# Patient Record
Sex: FEMALE | Race: WHITE | NOT HISPANIC OR LATINO | Employment: FULL TIME | URBAN - METROPOLITAN AREA
[De-identification: names, ages, dates, MRNs, and addresses within clinical notes are randomized per-mention and may not be internally consistent; named-entity substitution may affect disease eponyms.]

---

## 2024-11-04 ENCOUNTER — OFFICE VISIT (OUTPATIENT)
Dept: OBSTETRICS AND GYNECOLOGY | Age: 24
End: 2024-11-04
Payer: COMMERCIAL

## 2024-11-04 VITALS
SYSTOLIC BLOOD PRESSURE: 102 MMHG | HEIGHT: 61 IN | BODY MASS INDEX: 25.19 KG/M2 | WEIGHT: 133.4 LBS | DIASTOLIC BLOOD PRESSURE: 62 MMHG

## 2024-11-04 DIAGNOSIS — O09.32 LATE PRENATAL CARE AFFECTING PREGNANCY IN SECOND TRIMESTER: ICD-10-CM

## 2024-11-04 DIAGNOSIS — O36.80X0 ENCOUNTER TO DETERMINE FETAL VIABILITY OF PREGNANCY, SINGLE OR UNSPECIFIED FETUS: ICD-10-CM

## 2024-11-04 DIAGNOSIS — O09.32 LATE PRENATAL CARE AFFECTING PREGNANCY IN SECOND TRIMESTER: Primary | ICD-10-CM

## 2024-11-04 DIAGNOSIS — Z3A.20 20 WEEKS GESTATION OF PREGNANCY: Primary | ICD-10-CM

## 2024-11-04 LAB
B-HCG UR QL: POSITIVE
EXPIRATION DATE: ABNORMAL
INTERNAL NEGATIVE CONTROL: NEGATIVE
INTERNAL POSITIVE CONTROL: POSITIVE
Lab: ABNORMAL

## 2024-11-04 NOTE — PROGRESS NOTES
"Darrion WOODS Story is a 24 y.o. female is being seen today for   Chief Complaint   Patient presents with    Pregnancy conformation     New Gyn pt is 20w6d lmp 6/11    CC no complaints today    .    History of Present Illness    New patient to our office  Here for initial OB visit with her  Satnam  LMP 6/11, making her 20w6d today  States she was without insurance until now- had applied for medicaid and then ended up getting a job that provided insurance after 60 days  Has not received any PNC up to this point other than one viability ultrasound at a clinic  This is her first pregnancy- it was a planned pregnancy  She denies any significant health history, has never been on any medication or had any surgeries  Has had one pap prior that was normal  No family history of either side of genetic defects    The following portions of the patient's history were reviewed and updated as appropriate: allergies, current medications, past family history, past medical history, past social history, past surgical history and problem list.    /62   Ht 154.4 cm (60.79\")   Wt 60.5 kg (133 lb 6.4 oz)   LMP 06/11/2024 (Exact Date)   BMI 25.38 kg/m²         Review of Systems   Constitutional: Negative.    HENT: Negative.     Eyes: Negative.    Respiratory: Negative.     Cardiovascular: Negative.    Gastrointestinal: Negative.    Endocrine: Negative.    Genitourinary: Negative.    Musculoskeletal: Negative.    Skin: Negative.    Allergic/Immunologic: Negative.    Neurological: Negative.    Hematological: Negative.    Psychiatric/Behavioral: Negative.         Objective   Physical Exam  Constitutional:       Appearance: She is well-developed.   Cardiovascular:      Rate and Rhythm: Normal rate and regular rhythm.   Pulmonary:      Effort: Pulmonary effort is normal.   Abdominal:      General: Bowel sounds are normal. There is distension.      Palpations: Abdomen is soft.      Tenderness: There is no abdominal " tenderness.      Comments: Fundus at umbilicus   Skin:     General: Skin is warm and dry.   Neurological:      Mental Status: She is alert and oriented to person, place, and time.   Psychiatric:         Behavior: Behavior normal.           Assessment & Plan   Diagnoses and all orders for this visit:    1. 20 weeks gestation of pregnancy (Primary)  -     POC Pregnancy, Urine  -     Hemoglobinopathy Fractionation Castell  -     Varicella Zoster Antibody, IgG  -     Chlamydia trachomatis, Neisseria gonorrhoeae, PCR - Urine, Urine, Clean Catch  -     Urine Culture - Urine, Urine, Clean Catch  -     Drug Profile Urine - 9 Drugs - Urine, Clean Catch  -     OB Panel With HIV and RPR    2. Encounter to determine fetal viability of pregnancy, single or unspecified fetus    3. Late prenatal care affecting pregnancy in second trimester    Other orders  -     Fluzone >6mos ([id number])        Ultrasound done- S=D, anatomy normal but incomplete.  EFW 36%m CL 3.14cm  OB labs sent today  Declines NIPTs  Flu shot today  New OB folder given  Patient is taking Prenatal vitamins  Problem list reviewed and updated  Reviewed routine prenatal care with the office to include but not limited to expected weight gain during pregnancy, Tylenol products are fine, avoid aspirin and ibuprofen; Zika (travel restrictions/ok to use insect repellant); not to change cat litter; food restrictions; avoidance of alcohol, tobacco, drugs and saunas/hot tubs. Discussed that the COVID and Flu vaccine is safe and recommended in pregnancy.   SAB warnings reviewed  All questions answered   Pap deferred for postpartum unless primary OB prefers do it at next visit    Plan follow up in 4 weeks with Glucose test     Total time spent today with Helen  was 45 minutes (level 4).  Greater than 50% of the time was spent coordinating care, answering her questions and counseling regarding exercise in pregnancy, nutrition in pregnancy, weight gain in pregnancy, work and  travel restrictions during pregnancy, list of OTC medications acceptable in pregnancy and call coverage groups and pathophysiology of her presenting problem along with plans for any diagnositc work-up and treatment.

## 2024-11-05 LAB
ABO GROUP BLD: ABNORMAL
AMPHETAMINES UR QL SCN: NEGATIVE NG/ML
BARBITURATES UR QL SCN: NEGATIVE NG/ML
BASOPHILS # BLD AUTO: 0.1 X10E3/UL (ref 0–0.2)
BASOPHILS NFR BLD AUTO: 0 %
BENZODIAZ UR QL: NEGATIVE NG/ML
BLD GP AB SCN SERPL QL: NEGATIVE
BZE UR QL: NEGATIVE NG/ML
CANNABINOIDS UR QL SCN: NEGATIVE NG/ML
EOSINOPHIL # BLD AUTO: 0.1 X10E3/UL (ref 0–0.4)
EOSINOPHIL NFR BLD AUTO: 1 %
ERYTHROCYTE [DISTWIDTH] IN BLOOD BY AUTOMATED COUNT: 12.7 % (ref 11.7–15.4)
HBV SURFACE AG SERPL QL IA: NEGATIVE
HCT VFR BLD AUTO: 37.3 % (ref 34–46.6)
HCV AB SERPL QL IA: NORMAL
HCV IGG SERPL QL IA: NON REACTIVE
HGB A MFR BLD ELPH: 97.1 % (ref 96.4–98.8)
HGB A2 MFR BLD ELPH: 2.9 % (ref 1.8–3.2)
HGB BLD-MCNC: 12.2 G/DL (ref 11.1–15.9)
HGB F MFR BLD ELPH: 0 % (ref 0–2)
HGB FRACT BLD-IMP: NORMAL
HGB S MFR BLD ELPH: 0 %
HIV 1+2 AB+HIV1 P24 AG SERPL QL IA: NON REACTIVE
IMM GRANULOCYTES # BLD AUTO: 0.2 X10E3/UL (ref 0–0.1)
IMM GRANULOCYTES NFR BLD AUTO: 1 %
LYMPHOCYTES # BLD AUTO: 2 X10E3/UL (ref 0.7–3.1)
LYMPHOCYTES NFR BLD AUTO: 17 %
MCH RBC QN AUTO: 29.3 PG (ref 26.6–33)
MCHC RBC AUTO-ENTMCNC: 32.7 G/DL (ref 31.5–35.7)
MCV RBC AUTO: 90 FL (ref 79–97)
METHADONE UR QL SCN: NEGATIVE NG/ML
MONOCYTES # BLD AUTO: 0.7 X10E3/UL (ref 0.1–0.9)
MONOCYTES NFR BLD AUTO: 6 %
NEUTROPHILS # BLD AUTO: 8.9 X10E3/UL (ref 1.4–7)
NEUTROPHILS NFR BLD AUTO: 75 %
OPIATES UR QL: NEGATIVE NG/ML
PCP UR QL SCN: NEGATIVE NG/ML
PLATELET # BLD AUTO: 241 X10E3/UL (ref 150–450)
PROPOXYPH UR QL SCN: NEGATIVE NG/ML
RBC # BLD AUTO: 4.16 X10E6/UL (ref 3.77–5.28)
RH BLD: POSITIVE
RPR SER QL: NON REACTIVE
RUBV IGG SERPL IA-ACNC: 1.95 INDEX
VZV IGG SER QL IA: NON REACTIVE
WBC # BLD AUTO: 11.8 X10E3/UL (ref 3.4–10.8)

## 2024-11-06 LAB
BACTERIA UR CULT: NO GROWTH
BACTERIA UR CULT: NORMAL
C TRACH RRNA SPEC QL NAA+PROBE: NEGATIVE
N GONORRHOEA RRNA SPEC QL NAA+PROBE: NEGATIVE

## 2024-11-07 ENCOUNTER — PATIENT ROUNDING (BHMG ONLY) (OUTPATIENT)
Dept: OBSTETRICS AND GYNECOLOGY | Age: 24
End: 2024-11-07
Payer: COMMERCIAL

## 2024-12-03 ENCOUNTER — INITIAL PRENATAL (OUTPATIENT)
Dept: OBSTETRICS AND GYNECOLOGY | Age: 24
End: 2024-12-03
Payer: COMMERCIAL

## 2024-12-03 VITALS — BODY MASS INDEX: 26.45 KG/M2 | SYSTOLIC BLOOD PRESSURE: 108 MMHG | WEIGHT: 139 LBS | DIASTOLIC BLOOD PRESSURE: 60 MMHG

## 2024-12-03 DIAGNOSIS — Z34.90 PREGNANCY, UNSPECIFIED GESTATIONAL AGE: ICD-10-CM

## 2024-12-03 DIAGNOSIS — Z13.89 SCREENING FOR BLOOD OR PROTEIN IN URINE: Primary | ICD-10-CM

## 2024-12-03 DIAGNOSIS — O09.32 LATE PRENATAL CARE AFFECTING PREGNANCY IN SECOND TRIMESTER: ICD-10-CM

## 2024-12-03 PROBLEM — O36.80X0 ENCOUNTER TO DETERMINE FETAL VIABILITY OF PREGNANCY: Status: RESOLVED | Noted: 2024-11-04 | Resolved: 2024-12-03

## 2024-12-03 PROBLEM — Z3A.20 20 WEEKS GESTATION OF PREGNANCY: Status: RESOLVED | Noted: 2024-11-04 | Resolved: 2024-12-03

## 2024-12-03 LAB
GLUCOSE UR STRIP-MCNC: NEGATIVE MG/DL
PROT UR STRIP-MCNC: NEGATIVE MG/DL

## 2024-12-03 PROCEDURE — 0501F PRENATAL FLOW SHEET: CPT | Performed by: OBSTETRICS & GYNECOLOGY

## 2024-12-03 NOTE — PROGRESS NOTES
Chief Complaint   Patient presents with    Initial Prenatal Visit       HPI: 24 y.o.  at 25w0d by sure LMP consistent with then 4 days with 20-week ultrasound.  Patient also reports that she had a 15-week ultrasound done in [facility] that was in agreement with that EDC.  Patient is here today with her boyfriend Satnam.  They are excited about the pregnancy.  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  She is taking prenatal vitamins.  She has some mild back pain.  She thinks that she may have had chickenpox vaccination but she is going to check.  She is also going to check to see if her paternal aunt actually had ovarian cancer or another type of cancer.  Patient has had her flu shot    Relevant data reviewed:    Last OB US Data (since 2024)         Value Time User    EFW%ILE  36%ile 2024  7:22 AM Satnam Cole MD    AC%ILE  26%ile 2024  7:22 AM Satnam Cole MD    Fetal Survey  Incomplete 2024  7:22 AM Satnam Cole MD    Placenta location  Anterior 2024  7:22 AM Satnam Cole MD          Vitals:    24 0916   BP: 108/60   Weight: 63 kg (139 lb)     Total weight gain for pregnancy:  -0.454 kg (-1 lb)  Weight gain for pregnancy is low      Review of systems:   Gen: sleep deprived  CV:     negative  GI: negative  :   good fetal movement noted   MS:    back pain   Neuro: negative  Pul: negative    Physical Exam  Constitutional:       General: She is not in acute distress.     Appearance: Normal appearance. She is not ill-appearing.   Cardiovascular:      Rate and Rhythm: Regular rhythm.      Heart sounds: Normal heart sounds.   Pulmonary:      Breath sounds: Normal breath sounds.   Neurological:      Mental Status: She is alert.   Psychiatric:         Mood and Affect: Mood normal.         Thought Content: Thought content normal.         Judgment: Judgment normal.         A/P  1. Intrauterine pregnancy at 25w0d   2. Pregnancy Risk:  NORMAL    Diagnoses and all orders  for this visit:    1. Screening for blood or protein in urine (Primary)  -     POC Urinalysis Dipstick    2. Pregnancy, unspecified gestational age    3. Late prenatal care affecting pregnancy in second trimester    Ultrasound was reviewed today to complete anatomy.  Anatomy appears normal.  Baby is a girl.  We discussed cell free DNA and carrier testing but the patient declines.  She will check to see if she had varicella vaccination  She will check on her aunts type of cancer  New OB information was reviewed in detail  Return in 3 weeks for third trimester testing    Nutrition and weight gain were addressed.  Practice OB call structure was discussed.   Encouraged exercise at least 3 x weekly   Discussed Covid vaccination in pregnancy including CDC guidelines.  Vaccination strongly encouraged with risk of potential severe illness in unvaccinated.    -----------------------  PLAN:   Return in about 20 days (around 12/23/2024) for Recheck.    Satnam Cole MD  12/3/2024 10:03 EST

## 2025-01-14 ENCOUNTER — ROUTINE PRENATAL (OUTPATIENT)
Dept: OBSTETRICS AND GYNECOLOGY | Age: 25
End: 2025-01-14
Payer: COMMERCIAL

## 2025-01-14 VITALS — WEIGHT: 146 LBS | DIASTOLIC BLOOD PRESSURE: 74 MMHG | BODY MASS INDEX: 27.78 KG/M2 | SYSTOLIC BLOOD PRESSURE: 108 MMHG

## 2025-01-14 DIAGNOSIS — Z13.1 SCREENING FOR DIABETES MELLITUS: ICD-10-CM

## 2025-01-14 DIAGNOSIS — O09.33 INSUFFICIENT PRENATAL CARE IN THIRD TRIMESTER: ICD-10-CM

## 2025-01-14 DIAGNOSIS — O09.32 LATE PRENATAL CARE AFFECTING PREGNANCY IN SECOND TRIMESTER: ICD-10-CM

## 2025-01-14 DIAGNOSIS — Z13.89 SCREENING FOR BLOOD OR PROTEIN IN URINE: Primary | ICD-10-CM

## 2025-01-14 DIAGNOSIS — Z13.0 SCREENING FOR IRON DEFICIENCY ANEMIA: ICD-10-CM

## 2025-01-14 DIAGNOSIS — Z3A.31 31 WEEKS GESTATION OF PREGNANCY: ICD-10-CM

## 2025-01-14 LAB
GLUCOSE UR STRIP-MCNC: NEGATIVE MG/DL
PROT UR STRIP-MCNC: NEGATIVE MG/DL

## 2025-01-14 NOTE — PROGRESS NOTES
CC- pregnancy    HPI: 24 y.o.  at 31w0d patient reports that she missed visits due to having a cold.  She reports that she did check and she did have the chickenpox vaccination.  She was unable to make any contact with her aunt who had ovarian cancer.    EXAM:  Last OB US Data (since 2024)         Value Time User    EFW%ILE  58%ile 2025  2:40 PM Satnam Cole MD    AC%ILE  56%ile 2025  2:40 PM Satnam Cole MD    Fetal Survey  NL/Complete 2024  9:42 AM Satnam Cole MD    Placenta location  Anterior 2024  7:22 AM Satnam Cole MD          Vitals:    25 1151   BP: 108/74   Weight: 66.2 kg (146 lb)     Total weight gain for pregnancy:  2.722 kg (6 lb)  Weight gain for pregnancy is low  Ultrasound shows estimated fetal weight of 3 pounds 15 ounces at the 50th percentile.  Abdominal circumference at the 56 percentile.  Baby is vertex and HAI is normal at 18  Abdomen is nontender.  Fundal height is appropriate    A/P  1. Intrauterine pregnancy at 31w0d   2. Pregnancy Risk:  COMPLICATED    Diagnoses and all orders for this visit:    1. Screening for blood or protein in urine (Primary)  -     POC Urinalysis Dipstick    2. Screening for diabetes mellitus  -     Gestational Screen 1 Hr ([facility])    3. Screening for iron deficiency anemia  -     CBC (No Diff)    4. Late prenatal care affecting pregnancy in second trimester    5. 31 weeks gestation of pregnancy    6. Insufficient prenatal care in third trimester    Other orders  -     Tdap Vaccine => 6yo IM (BOOSTRIX/ADACEL)      -----------------------  Discussed the importance of regular prenatal care  Third trimester labs and Tdap was done today.  Patient initially declined the Tdap but after counseling agreed  Fetal weight is normal today  Patient is considering natural childbirth  Follow-up in 2 weeks  Patient is looking for a pediatrician.  She does live in [place] [facility].      Satnam Cole MD  2025 14:41 EST

## 2025-01-15 LAB
ERYTHROCYTE [DISTWIDTH] IN BLOOD BY AUTOMATED COUNT: 13.1 % (ref 11.7–15.4)
GLUCOSE 1H P 50 G GLC PO SERPL-MCNC: 89 MG/DL (ref 70–139)
HCT VFR BLD AUTO: 34.8 % (ref 34–46.6)
HGB BLD-MCNC: 11.2 G/DL (ref 11.1–15.9)
MCH RBC QN AUTO: 28.4 PG (ref 26.6–33)
MCHC RBC AUTO-ENTMCNC: 32.2 G/DL (ref 31.5–35.7)
MCV RBC AUTO: 88 FL (ref 79–97)
PLATELET # BLD AUTO: 212 X10E3/UL (ref 150–450)
RBC # BLD AUTO: 3.95 X10E6/UL (ref 3.77–5.28)
WBC # BLD AUTO: 11.9 X10E3/UL (ref 3.4–10.8)

## 2025-01-28 ENCOUNTER — ROUTINE PRENATAL (OUTPATIENT)
Dept: OBSTETRICS AND GYNECOLOGY | Age: 25
End: 2025-01-28
Payer: COMMERCIAL

## 2025-01-28 VITALS — SYSTOLIC BLOOD PRESSURE: 108 MMHG | BODY MASS INDEX: 28.73 KG/M2 | DIASTOLIC BLOOD PRESSURE: 62 MMHG | WEIGHT: 151 LBS

## 2025-01-28 DIAGNOSIS — Z13.89 SCREENING FOR BLOOD OR PROTEIN IN URINE: Primary | ICD-10-CM

## 2025-01-28 DIAGNOSIS — Z3A.33 33 WEEKS GESTATION OF PREGNANCY: ICD-10-CM

## 2025-01-28 DIAGNOSIS — O09.32 LATE PRENATAL CARE AFFECTING PREGNANCY IN SECOND TRIMESTER: ICD-10-CM

## 2025-01-28 LAB
GLUCOSE UR STRIP-MCNC: NEGATIVE MG/DL
PROT UR STRIP-MCNC: NEGATIVE MG/DL

## 2025-01-28 NOTE — PROGRESS NOTES
CC- pregnancy    HPI: 24 y.o.  at 33w0d patient has trouble lying down to sleep.  She does sit propped up and she feels okay.  She sometimes feels little bit lightheaded when she lays flat on her back.  Baby is moving well.  She is still looking for a pediatrician.    EXAM:  Last OB US Data (since 2024)         Value Time User    EFW%ILE  58%ile 2025  2:40 PM Satnam Cole MD    AC%ILE  56%ile 2025  2:40 PM Satnam Cole MD    Fetal Survey  NL/Complete 2024  9:42 AM Satnam Cole MD    Placenta location  Anterior 2024  7:22 AM Satnam Cole MD          Vitals:    25 1105   BP: 108/62   Weight: 68.5 kg (151 lb)     Total weight gain for pregnancy:  4.99 kg (11 lb)  Weight gain for pregnancy is low but improving.  Doppler heart tones are positive and fundal height is appropriate  Third trimester labs are normal    A/P  1. Intrauterine pregnancy at 33w0d   2. Pregnancy Risk:  COMPLICATED    Diagnoses and all orders for this visit:    1. Screening for blood or protein in urine (Primary)  -     POC Urinalysis Dipstick    2. Late prenatal care affecting pregnancy in second trimester    3. 33 weeks gestation of pregnancy      -----------------------    Discussed sleeping positions  Patient is going to have a massage  Recommend kick counts  Patient is looking for pediatrician  I do need to follow-up at her next visit on family history of ovarian cancer and see if she wants to do genetic testing.    Satnam Cole MD  2025 12:57 EST

## 2025-02-05 ENCOUNTER — TELEPHONE (OUTPATIENT)
Dept: OBSTETRICS AND GYNECOLOGY | Age: 25
End: 2025-02-05
Payer: COMMERCIAL

## 2025-02-05 NOTE — TELEPHONE ENCOUNTER
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

## 2025-02-10 ENCOUNTER — ROUTINE PRENATAL (OUTPATIENT)
Dept: OBSTETRICS AND GYNECOLOGY | Age: 25
End: 2025-02-10
Payer: COMMERCIAL

## 2025-02-10 VITALS — DIASTOLIC BLOOD PRESSURE: 64 MMHG | SYSTOLIC BLOOD PRESSURE: 108 MMHG | BODY MASS INDEX: 28.92 KG/M2 | WEIGHT: 152 LBS

## 2025-02-10 DIAGNOSIS — Z3A.34 34 WEEKS GESTATION OF PREGNANCY: ICD-10-CM

## 2025-02-10 DIAGNOSIS — O09.32 LATE PRENATAL CARE AFFECTING PREGNANCY IN SECOND TRIMESTER: ICD-10-CM

## 2025-02-10 DIAGNOSIS — Z13.89 SCREENING FOR BLOOD OR PROTEIN IN URINE: Primary | ICD-10-CM

## 2025-02-10 LAB
GLUCOSE UR STRIP-MCNC: NEGATIVE MG/DL
PROT UR STRIP-MCNC: NEGATIVE MG/DL

## 2025-02-10 NOTE — PROGRESS NOTES
CC- pregnancy    HPI: 24 y.o.  at 34w6d patient is feeling good fetal movements.  No complaints.  She does not want to do genetic testing for family history of ovarian cancer.  She is looking for a pediatrician.    EXAM:  Last OB US Data (since 2024)         Value Time User    EFW%ILE  58%ile 2025  2:40 PM Satnam Cole MD    AC%ILE  56%ile 2025  2:40 PM Satnam Cole MD    Fetal Survey  NL/Complete 2024  9:42 AM Satnam Cole MD    Placenta location  Anterior 2024  7:22 AM Satnam Cole MD          Vitals:    02/10/25 1512   BP: 108/64   Weight: 68.9 kg (152 lb)     Total weight gain for pregnancy:  5.443 kg (12 lb)  Weight gain for pregnancy is low but improving  Doppler tones are positive and fundal height is appropriate    A/P  1. Intrauterine pregnancy at 34w6d   2. Pregnancy Risk:  COMPLICATED    Diagnoses and all orders for this visit:    1. Screening for blood or protein in urine (Primary)  -     POC Urinalysis Dipstick    2. Late prenatal care affecting pregnancy in second trimester    3. 34 weeks gestation of pregnancy      -----------------------  Recommend kick counts  Discussed GBS testing and cervical checks  Patient does not want to do ovarian cancer genetic testing.  She is looking for pediatrician.      Satnam Cole MD  2/10/2025 15:21 EST

## 2025-02-18 ENCOUNTER — ROUTINE PRENATAL (OUTPATIENT)
Dept: OBSTETRICS AND GYNECOLOGY | Age: 25
End: 2025-02-18
Payer: COMMERCIAL

## 2025-02-18 VITALS — SYSTOLIC BLOOD PRESSURE: 104 MMHG | DIASTOLIC BLOOD PRESSURE: 58 MMHG | WEIGHT: 154 LBS | BODY MASS INDEX: 29.3 KG/M2

## 2025-02-18 DIAGNOSIS — Z36.85 ANTENATAL SCREENING FOR STREPTOCOCCUS B: ICD-10-CM

## 2025-02-18 DIAGNOSIS — Z13.89 SCREENING FOR BLOOD OR PROTEIN IN URINE: Primary | ICD-10-CM

## 2025-02-18 DIAGNOSIS — O09.32 LATE PRENATAL CARE AFFECTING PREGNANCY IN SECOND TRIMESTER: ICD-10-CM

## 2025-02-18 DIAGNOSIS — Z3A.36 36 WEEKS GESTATION OF PREGNANCY: ICD-10-CM

## 2025-02-18 LAB
GLUCOSE UR STRIP-MCNC: NEGATIVE MG/DL
PROT UR STRIP-MCNC: NEGATIVE MG/DL

## 2025-02-18 NOTE — PROGRESS NOTES
CC- pregnancy    HPI: 24 y.o.  at 36w0d patient is feeling some pelvic pressure.  No regular painful contractions.  Baby is moving well.  She did pick at her pediatrician.    EXAM:  Last OB US Data (since 2024)         Value Time User    EFW%ILE  58%ile 2025  2:40 PM Satnam Cole MD    AC%ILE  56%ile 2025  2:40 PM Satnam Cole MD    Fetal Survey  NL/Complete 2024  9:42 AM Satnam Cole MD    Placenta location  Anterior 2024  7:22 AM Satnam Cole MD          Vitals:    25 1401   BP: 104/58   Weight: 69.9 kg (154 lb)     Total weight gain for pregnancy:  6.35 kg (14 lb)  Weight gain for pregnancy is now normal  GBS swab is collected today  Cervix is closed thick and posterior  Doppler tones are positive and fundal height is appropriate  No proteinuria today      A/P  1. Intrauterine pregnancy at 36w0d   2. Pregnancy Risk:  NORMAL    Diagnoses and all orders for this visit:    1. Screening for blood or protein in urine (Primary)  -     POC Urinalysis Dipstick    2.  screening for streptococcus B  -     Strep B Screen - Swab, Vaginal/Rectum    3. 36 weeks gestation of pregnancy    4. Late prenatal care affecting pregnancy in second trimester      -----------------------  Pediatrician recorded today  Recommend kick counting  Follow-up weekly      Satnam Cole MD  2025 17:51 EST

## 2025-02-20 PROBLEM — O99.820 GBS (GROUP B STREPTOCOCCUS CARRIER), +RV CULTURE, CURRENTLY PREGNANT: Status: ACTIVE | Noted: 2025-02-20

## 2025-02-20 LAB — GP B STREP DNA SPEC QL NAA+PROBE: POSITIVE

## 2025-02-21 ENCOUNTER — TELEPHONE (OUTPATIENT)
Dept: OBSTETRICS AND GYNECOLOGY | Age: 25
End: 2025-02-21
Payer: COMMERCIAL

## 2025-02-24 ENCOUNTER — ROUTINE PRENATAL (OUTPATIENT)
Dept: OBSTETRICS AND GYNECOLOGY | Age: 25
End: 2025-02-24
Payer: COMMERCIAL

## 2025-02-24 VITALS — WEIGHT: 156 LBS | DIASTOLIC BLOOD PRESSURE: 76 MMHG | BODY MASS INDEX: 29.68 KG/M2 | SYSTOLIC BLOOD PRESSURE: 120 MMHG

## 2025-02-24 DIAGNOSIS — Z3A.36 36 WEEKS GESTATION OF PREGNANCY: ICD-10-CM

## 2025-02-24 DIAGNOSIS — Z13.89 SCREENING FOR BLOOD OR PROTEIN IN URINE: Primary | ICD-10-CM

## 2025-02-24 DIAGNOSIS — O09.32 LATE PRENATAL CARE AFFECTING PREGNANCY IN SECOND TRIMESTER: ICD-10-CM

## 2025-02-24 DIAGNOSIS — O99.820 GBS (GROUP B STREPTOCOCCUS CARRIER), +RV CULTURE, CURRENTLY PREGNANT: ICD-10-CM

## 2025-02-24 LAB
GLUCOSE UR STRIP-MCNC: NEGATIVE MG/DL
PROT UR STRIP-MCNC: NEGATIVE MG/DL

## 2025-02-24 NOTE — PROGRESS NOTES
CC- pregnancy    HPI: 24 y.o.  at 36w6d patient is feeling good fetal movements.  No regular contractions.    EXAM:  Last OB US Data (since 2024)         Value Time User    EFW%ILE  58%ile 2025  2:40 PM Satnam Cole MD    AC%ILE  56%ile 2025  2:40 PM Satnam Cole MD    Fetal Survey  NL/Complete 2024  9:42 AM Satnam Cole MD    Placenta location  Anterior 2024  7:22 AM Satnam Cole MD          Vitals:    25 1326   BP: 120/76   Weight: 70.8 kg (156 lb)     Total weight gain for pregnancy:  7.258 kg (16 lb)  Gain for pregnancy is normal  GBS is positive  Cervix is 1 cm 50% -2 station and posterior  Doppler heart tones are positive and fundal height is appropriate    A/P  1. Intrauterine pregnancy at 36w6d   2. Pregnancy Risk:  NORMAL    Diagnoses and all orders for this visit:    1. Screening for blood or protein in urine (Primary)  -     POC Urinalysis Dipstick    2. GBS (group B Streptococcus carrier), +RV culture, currently pregnant    3. 36 weeks gestation of pregnancy    4. Late prenatal care affecting pregnancy in second trimester      ----------------------  Discussed GBS treatment in labor  Labor warnings given  Recommend kick counts.      Satnam Cole MD  2025 13:45 EST

## 2025-03-03 ENCOUNTER — ROUTINE PRENATAL (OUTPATIENT)
Dept: OBSTETRICS AND GYNECOLOGY | Age: 25
End: 2025-03-03
Payer: COMMERCIAL

## 2025-03-03 VITALS — BODY MASS INDEX: 30.44 KG/M2 | WEIGHT: 160 LBS | DIASTOLIC BLOOD PRESSURE: 60 MMHG | SYSTOLIC BLOOD PRESSURE: 120 MMHG

## 2025-03-03 DIAGNOSIS — Z13.89 SCREENING FOR BLOOD OR PROTEIN IN URINE: Primary | ICD-10-CM

## 2025-03-03 DIAGNOSIS — O99.820 GBS (GROUP B STREPTOCOCCUS CARRIER), +RV CULTURE, CURRENTLY PREGNANT: ICD-10-CM

## 2025-03-03 DIAGNOSIS — O09.32 LATE PRENATAL CARE AFFECTING PREGNANCY IN SECOND TRIMESTER: ICD-10-CM

## 2025-03-03 DIAGNOSIS — Z3A.37 37 WEEKS GESTATION OF PREGNANCY: ICD-10-CM

## 2025-03-03 LAB
GLUCOSE UR STRIP-MCNC: NEGATIVE MG/DL
PROT UR STRIP-MCNC: NEGATIVE MG/DL

## 2025-03-03 NOTE — PROGRESS NOTES
CC- pregnancy    HPI: 24 y.o.  at 37w6d patient is feeling well.  She has no complaints.  Baby is active.    EXAM:  Last OB US Data (since 8/15/2024)         Value Time User    EFW%ILE  58%ile 2025  2:40 PM Satnam Cole MD    AC%ILE  56%ile 2025  2:40 PM Satnam Cole MD    Fetal Survey  NL/Complete 2024  9:42 AM Satnam Cole MD    Placenta location  Anterior 2024  7:22 AM Satnam Cole MD          Vitals:    25 1355   BP: 120/60   Weight: 72.6 kg (160 lb)     Total weight gain for pregnancy:  9.072 kg (20 lb)  Weight gain for pregnancy is normal  No proteinuria  Cervix is 1 to 2 cm / 50%/moderate consistency and -2 station  Doppler heart tones are positive and fundal height is appropriate    A/P  1. Intrauterine pregnancy at 37w6d   2. Pregnancy Risk:  NORMAL    Diagnoses and all orders for this visit:    1. Screening for blood or protein in urine (Primary)  -     POC Urinalysis Dipstick    2. GBS (group B Streptococcus carrier), +RV culture, currently pregnant    3. 37 weeks gestation of pregnancy    4. Late prenatal care affecting pregnancy in second trimester      -----------------------  Recommend kick counts  Continue weekly visits  GBS treatment in labor.      Satnam Cole MD  3/3/2025 14:27 EST

## 2025-03-10 ENCOUNTER — ROUTINE PRENATAL (OUTPATIENT)
Dept: OBSTETRICS AND GYNECOLOGY | Age: 25
End: 2025-03-10
Payer: COMMERCIAL

## 2025-03-10 VITALS — SYSTOLIC BLOOD PRESSURE: 110 MMHG | BODY MASS INDEX: 30.25 KG/M2 | DIASTOLIC BLOOD PRESSURE: 64 MMHG | WEIGHT: 159 LBS

## 2025-03-10 DIAGNOSIS — O09.32 LATE PRENATAL CARE AFFECTING PREGNANCY IN SECOND TRIMESTER: ICD-10-CM

## 2025-03-10 DIAGNOSIS — O99.820 GBS (GROUP B STREPTOCOCCUS CARRIER), +RV CULTURE, CURRENTLY PREGNANT: ICD-10-CM

## 2025-03-10 DIAGNOSIS — Z3A.38 38 WEEKS GESTATION OF PREGNANCY: ICD-10-CM

## 2025-03-10 DIAGNOSIS — Z13.89 SCREENING FOR BLOOD OR PROTEIN IN URINE: Primary | ICD-10-CM

## 2025-03-10 LAB
GLUCOSE UR STRIP-MCNC: NEGATIVE MG/DL
PROT UR STRIP-MCNC: NEGATIVE MG/DL

## 2025-03-10 NOTE — PROGRESS NOTES
CC- pregnancy    HPI: 24 y.o.  at 38w6d patient feels some pelvic pressure but no regular painful contractions.  She is still working.  Baby is moving well.    EXAM:  Last OB US Data (since 2024)         Value Time User    EFW%ILE  58%ile 2025  2:40 PM Satnam Cole MD    AC%ILE  56%ile 2025  2:40 PM Satnam Cole MD    Fetal Survey  NL/Complete 2024  9:42 AM Satnam Cloe MD    Placenta location  Anterior 2024  7:22 AM Satnam Cole MD          Vitals:    03/10/25 1055   BP: 110/64   Weight: 72.1 kg (159 lb)     Total weight gain for pregnancy:  8.618 kg (19 lb)  GBS is positive  Cervix is 2 cm 50% and -2 station moderate consistency  Doppler heart tones are positive and fundal height is appropriate  No proteinuria today and normal blood pressure.    A/P  1. Intrauterine pregnancy at 38w6d   2. Pregnancy Risk:  NORMAL    Diagnoses and all orders for this visit:    1. Screening for blood or protein in urine (Primary)  -     POC Urinalysis Dipstick    2. GBS (group B Streptococcus carrier), +RV culture, currently pregnant    3. 38 weeks gestation of pregnancy    4. Late prenatal care affecting pregnancy in second trimester      -----------------------    Discussed possible induction of labor.  Patient is not sure about that but would like to return on Friday for repeat check of her cervix.  GBS-treat in labor  Recommend kick counts    Satnam Cole MD  3/10/2025 11:10 EDT

## 2025-03-14 ENCOUNTER — ANESTHESIA (OUTPATIENT)
Dept: LABOR AND DELIVERY | Facility: HOSPITAL | Age: 25
End: 2025-03-14
Payer: COMMERCIAL

## 2025-03-14 ENCOUNTER — HOSPITAL ENCOUNTER (INPATIENT)
Facility: HOSPITAL | Age: 25
LOS: 2 days | Discharge: HOME | End: 2025-03-16
Attending: OBSTETRICS & GYNECOLOGY | Admitting: OBSTETRICS & GYNECOLOGY
Payer: COMMERCIAL

## 2025-03-14 ENCOUNTER — ANESTHESIA EVENT (OUTPATIENT)
Dept: LABOR AND DELIVERY | Facility: HOSPITAL | Age: 25
End: 2025-03-14
Payer: COMMERCIAL

## 2025-03-14 PROBLEM — O42.90 RUPTURE OF MEMBRANES WITH DELAY OF DELIVERY: Status: ACTIVE | Noted: 2025-03-14

## 2025-03-14 PROBLEM — E66.9 OBESITY (BMI 30-39.9): Status: ACTIVE | Noted: 2025-03-14

## 2025-03-14 PROBLEM — F12.90 MARIJUANA USE: Status: ACTIVE | Noted: 2025-03-14

## 2025-03-14 PROBLEM — Z37.9 NORMAL LABOR: Status: ACTIVE | Noted: 2025-03-14

## 2025-03-14 LAB
ABO GROUP BLD: NORMAL
AMPHET+METHAMPHET UR QL: NEGATIVE
AMPHETAMINES UR QL: NEGATIVE
BARBITURATES UR QL SCN: NEGATIVE
BENZODIAZ UR QL SCN: NEGATIVE
BLD GP AB SCN SERPL QL: NEGATIVE
BUPRENORPHINE SERPL-MCNC: NEGATIVE NG/ML
CANNABINOIDS SERPL QL: POSITIVE
COCAINE UR QL: NEGATIVE
DEPRECATED RDW RBC AUTO: 45.3 FL (ref 37–54)
ERYTHROCYTE [DISTWIDTH] IN BLOOD BY AUTOMATED COUNT: 14.5 % (ref 12.3–15.4)
FENTANYL UR-MCNC: NEGATIVE NG/ML
HCT VFR BLD AUTO: 36.2 % (ref 34–46.6)
HCV AB SER QL: NORMAL
HGB BLD-MCNC: 12.3 G/DL (ref 12–15.9)
MCH RBC QN AUTO: 29 PG (ref 26.6–33)
MCHC RBC AUTO-ENTMCNC: 34 G/DL (ref 31.5–35.7)
MCV RBC AUTO: 85.4 FL (ref 79–97)
METHADONE UR QL SCN: NEGATIVE
OPIATES UR QL: NEGATIVE
OXYCODONE UR QL SCN: NEGATIVE
PCP UR QL SCN: NEGATIVE
PLATELET # BLD AUTO: 187 10*3/MM3 (ref 140–450)
PMV BLD AUTO: 11.2 FL (ref 6–12)
RBC # BLD AUTO: 4.24 10*6/MM3 (ref 3.77–5.28)
RH BLD: POSITIVE
T&S EXPIRATION DATE: NORMAL
TREPONEMA PALLIDUM IGG+IGM AB [PRESENCE] IN SERUM OR PLASMA BY IMMUNOASSAY: NORMAL
TRICYCLICS UR QL SCN: NEGATIVE
WBC NRBC COR # BLD AUTO: 10.09 10*3/MM3 (ref 3.4–10.8)

## 2025-03-14 PROCEDURE — 25010000002 LIDOCAINE 1% - EPINEPHRINE 1:100000 1 %-1:100000 SOLUTION: Performed by: ANESTHESIOLOGY

## 2025-03-14 PROCEDURE — 25010000002 ONDANSETRON PER 1 MG: Performed by: ANESTHESIOLOGY

## 2025-03-14 PROCEDURE — 86850 RBC ANTIBODY SCREEN: CPT | Performed by: OBSTETRICS & GYNECOLOGY

## 2025-03-14 PROCEDURE — 25010000002 PENICILLIN G POTASSIUM PER 600000 UNITS: Performed by: OBSTETRICS & GYNECOLOGY

## 2025-03-14 PROCEDURE — 25010000002 LIDOCAINE-EPINEPHRINE (PF) 2 %-1:200000 SOLUTION: Performed by: ANESTHESIOLOGY

## 2025-03-14 PROCEDURE — C1755 CATHETER, INTRASPINAL: HCPCS | Performed by: ANESTHESIOLOGY

## 2025-03-14 PROCEDURE — S0260 H&P FOR SURGERY: HCPCS | Performed by: STUDENT IN AN ORGANIZED HEALTH CARE EDUCATION/TRAINING PROGRAM

## 2025-03-14 PROCEDURE — 99202 OFFICE O/P NEW SF 15 MIN: CPT | Performed by: OBSTETRICS & GYNECOLOGY

## 2025-03-14 PROCEDURE — 25010000002 DROPERIDOL PER 5 MG: Performed by: NURSE ANESTHETIST, CERTIFIED REGISTERED

## 2025-03-14 PROCEDURE — 80307 DRUG TEST PRSMV CHEM ANLYZR: CPT | Performed by: OBSTETRICS & GYNECOLOGY

## 2025-03-14 PROCEDURE — 86780 TREPONEMA PALLIDUM: CPT | Performed by: OBSTETRICS & GYNECOLOGY

## 2025-03-14 PROCEDURE — 3E0E7GC INTRODUCTION OF OTHER THERAPEUTIC SUBSTANCE INTO PRODUCTS OF CONCEPTION, VIA NATURAL OR ARTIFICIAL OPENING: ICD-10-PCS | Performed by: STUDENT IN AN ORGANIZED HEALTH CARE EDUCATION/TRAINING PROGRAM

## 2025-03-14 PROCEDURE — 3E033VJ INTRODUCTION OF OTHER HORMONE INTO PERIPHERAL VEIN, PERCUTANEOUS APPROACH: ICD-10-PCS | Performed by: OBSTETRICS & GYNECOLOGY

## 2025-03-14 PROCEDURE — 25010000002 LIDOCAINE HCL (PF) 1.5 % SOLUTION: Performed by: ANESTHESIOLOGY

## 2025-03-14 PROCEDURE — 25010000002 PENICILLIN G POTASSIUM PER 600000 UNITS: Performed by: STUDENT IN AN ORGANIZED HEALTH CARE EDUCATION/TRAINING PROGRAM

## 2025-03-14 PROCEDURE — 86901 BLOOD TYPING SEROLOGIC RH(D): CPT | Performed by: OBSTETRICS & GYNECOLOGY

## 2025-03-14 PROCEDURE — 25810000003 SODIUM CHLORIDE 0.9 % SOLUTION 250 ML FLEX CONT: Performed by: STUDENT IN AN ORGANIZED HEALTH CARE EDUCATION/TRAINING PROGRAM

## 2025-03-14 PROCEDURE — 25010000002 CEFAZOLIN PER 500 MG: Performed by: STUDENT IN AN ORGANIZED HEALTH CARE EDUCATION/TRAINING PROGRAM

## 2025-03-14 PROCEDURE — 86803 HEPATITIS C AB TEST: CPT | Performed by: OBSTETRICS & GYNECOLOGY

## 2025-03-14 PROCEDURE — 25010000002 AZITHROMYCIN PER 500 MG: Performed by: STUDENT IN AN ORGANIZED HEALTH CARE EDUCATION/TRAINING PROGRAM

## 2025-03-14 PROCEDURE — G0480 DRUG TEST DEF 1-7 CLASSES: HCPCS | Performed by: OBSTETRICS & GYNECOLOGY

## 2025-03-14 PROCEDURE — 25810000003 LACTATED RINGERS PER 1000 ML: Performed by: OBSTETRICS & GYNECOLOGY

## 2025-03-14 PROCEDURE — 85027 COMPLETE CBC AUTOMATED: CPT | Performed by: OBSTETRICS & GYNECOLOGY

## 2025-03-14 PROCEDURE — 25810000003 SODIUM CHLORIDE 0.9 % SOLUTION: Performed by: STUDENT IN AN ORGANIZED HEALTH CARE EDUCATION/TRAINING PROGRAM

## 2025-03-14 PROCEDURE — 25010000002 ONDANSETRON PER 1 MG: Performed by: STUDENT IN AN ORGANIZED HEALTH CARE EDUCATION/TRAINING PROGRAM

## 2025-03-14 PROCEDURE — 25810000003 LACTATED RINGERS PER 1000 ML: Performed by: STUDENT IN AN ORGANIZED HEALTH CARE EDUCATION/TRAINING PROGRAM

## 2025-03-14 PROCEDURE — 86900 BLOOD TYPING SEROLOGIC ABO: CPT | Performed by: OBSTETRICS & GYNECOLOGY

## 2025-03-14 RX ORDER — SODIUM CHLORIDE 9 MG/ML
40 INJECTION, SOLUTION INTRAVENOUS AS NEEDED
Status: DISCONTINUED | OUTPATIENT
Start: 2025-03-14 | End: 2025-03-15

## 2025-03-14 RX ORDER — LIDOCAINE HCL/EPINEPHRINE/PF 2%-1:200K
VIAL (ML) INJECTION AS NEEDED
Status: DISCONTINUED | OUTPATIENT
Start: 2025-03-14 | End: 2025-03-15 | Stop reason: SURG

## 2025-03-14 RX ORDER — OXYTOCIN/0.9 % SODIUM CHLORIDE 30/500 ML
250 PLASTIC BAG, INJECTION (ML) INTRAVENOUS CONTINUOUS
Status: ACTIVE | OUTPATIENT
Start: 2025-03-14 | End: 2025-03-15

## 2025-03-14 RX ORDER — DIPHENHYDRAMINE HYDROCHLORIDE 50 MG/ML
12.5 INJECTION, SOLUTION INTRAMUSCULAR; INTRAVENOUS EVERY 8 HOURS PRN
Status: DISCONTINUED | OUTPATIENT
Start: 2025-03-14 | End: 2025-03-15 | Stop reason: HOSPADM

## 2025-03-14 RX ORDER — EPHEDRINE SULFATE 50 MG/ML
5 INJECTION, SOLUTION INTRAVENOUS
Status: DISCONTINUED | OUTPATIENT
Start: 2025-03-14 | End: 2025-03-15 | Stop reason: HOSPADM

## 2025-03-14 RX ORDER — ACETAMINOPHEN 325 MG/1
650 TABLET ORAL EVERY 4 HOURS PRN
Status: DISCONTINUED | OUTPATIENT
Start: 2025-03-14 | End: 2025-03-15

## 2025-03-14 RX ORDER — ONDANSETRON 2 MG/ML
4 INJECTION INTRAMUSCULAR; INTRAVENOUS ONCE AS NEEDED
Status: DISCONTINUED | OUTPATIENT
Start: 2025-03-14 | End: 2025-03-15

## 2025-03-14 RX ORDER — LIDOCAINE HYDROCHLORIDE 10 MG/ML
0.5 INJECTION, SOLUTION INFILTRATION; PERINEURAL ONCE AS NEEDED
Status: DISCONTINUED | OUTPATIENT
Start: 2025-03-14 | End: 2025-03-15

## 2025-03-14 RX ORDER — SODIUM CHLORIDE 0.9 % (FLUSH) 0.9 %
10 SYRINGE (ML) INJECTION EVERY 12 HOURS SCHEDULED
Status: DISCONTINUED | OUTPATIENT
Start: 2025-03-14 | End: 2025-03-15

## 2025-03-14 RX ORDER — METHYLERGONOVINE MALEATE 0.2 MG/ML
200 INJECTION INTRAVENOUS ONCE AS NEEDED
Status: DISCONTINUED | OUTPATIENT
Start: 2025-03-14 | End: 2025-03-15

## 2025-03-14 RX ORDER — ONDANSETRON 2 MG/ML
4 INJECTION INTRAMUSCULAR; INTRAVENOUS EVERY 6 HOURS PRN
Status: DISCONTINUED | OUTPATIENT
Start: 2025-03-14 | End: 2025-03-16 | Stop reason: HOSPADM

## 2025-03-14 RX ORDER — ERYTHROMYCIN 5 MG/G
OINTMENT OPHTHALMIC
Status: ACTIVE
Start: 2025-03-14 | End: 2025-03-15

## 2025-03-14 RX ORDER — SODIUM CHLORIDE, SODIUM LACTATE, POTASSIUM CHLORIDE, CALCIUM CHLORIDE 600; 310; 30; 20 MG/100ML; MG/100ML; MG/100ML; MG/100ML
125 INJECTION, SOLUTION INTRAVENOUS CONTINUOUS
Status: ACTIVE | OUTPATIENT
Start: 2025-03-14 | End: 2025-03-14

## 2025-03-14 RX ORDER — PENICILLIN G 3000000 [IU]/50ML
3 INJECTION, SOLUTION INTRAVENOUS EVERY 4 HOURS
Status: COMPLETED | OUTPATIENT
Start: 2025-03-14 | End: 2025-03-14

## 2025-03-14 RX ORDER — LIDOCAINE HYDROCHLORIDE AND EPINEPHRINE 10; 10 MG/ML; UG/ML
INJECTION, SOLUTION INFILTRATION; PERINEURAL AS NEEDED
Status: DISCONTINUED | OUTPATIENT
Start: 2025-03-14 | End: 2025-03-15 | Stop reason: SURG

## 2025-03-14 RX ORDER — DROPERIDOL 2.5 MG/ML
INJECTION, SOLUTION INTRAMUSCULAR; INTRAVENOUS AS NEEDED
Status: DISCONTINUED | OUTPATIENT
Start: 2025-03-14 | End: 2025-03-15 | Stop reason: SURG

## 2025-03-14 RX ORDER — OXYTOCIN/0.9 % SODIUM CHLORIDE 30/500 ML
999 PLASTIC BAG, INJECTION (ML) INTRAVENOUS ONCE
Status: DISCONTINUED | OUTPATIENT
Start: 2025-03-14 | End: 2025-03-15 | Stop reason: HOSPADM

## 2025-03-14 RX ORDER — FAMOTIDINE 10 MG/ML
20 INJECTION, SOLUTION INTRAVENOUS ONCE AS NEEDED
Status: COMPLETED | OUTPATIENT
Start: 2025-03-14 | End: 2025-03-14

## 2025-03-14 RX ORDER — SODIUM CHLORIDE, SODIUM LACTATE, POTASSIUM CHLORIDE, CALCIUM CHLORIDE 600; 310; 30; 20 MG/100ML; MG/100ML; MG/100ML; MG/100ML
125 INJECTION, SOLUTION INTRAVENOUS CONTINUOUS
Status: DISCONTINUED | OUTPATIENT
Start: 2025-03-14 | End: 2025-03-15

## 2025-03-14 RX ORDER — OXYTOCIN/0.9 % SODIUM CHLORIDE 30/500 ML
999 PLASTIC BAG, INJECTION (ML) INTRAVENOUS ONCE
Status: COMPLETED | OUTPATIENT
Start: 2025-03-14 | End: 2025-03-14

## 2025-03-14 RX ORDER — MISOPROSTOL 200 UG/1
800 TABLET ORAL ONCE AS NEEDED
Status: DISCONTINUED | OUTPATIENT
Start: 2025-03-14 | End: 2025-03-15

## 2025-03-14 RX ORDER — PENICILLIN G 3000000 [IU]/50ML
3 INJECTION, SOLUTION INTRAVENOUS EVERY 4 HOURS
Status: DISCONTINUED | OUTPATIENT
Start: 2025-03-15 | End: 2025-03-14

## 2025-03-14 RX ORDER — ONDANSETRON 2 MG/ML
4 INJECTION INTRAMUSCULAR; INTRAVENOUS ONCE AS NEEDED
Status: COMPLETED | OUTPATIENT
Start: 2025-03-14 | End: 2025-03-14

## 2025-03-14 RX ORDER — CARBOPROST TROMETHAMINE 250 UG/ML
250 INJECTION, SOLUTION INTRAMUSCULAR
Status: DISCONTINUED | OUTPATIENT
Start: 2025-03-14 | End: 2025-03-15

## 2025-03-14 RX ORDER — MAGNESIUM CARB/ALUMINUM HYDROX 105-160MG
30 TABLET,CHEWABLE ORAL ONCE
Status: DISCONTINUED | OUTPATIENT
Start: 2025-03-14 | End: 2025-03-15

## 2025-03-14 RX ORDER — LIDOCAINE HYDROCHLORIDE 15 MG/ML
INJECTION, SOLUTION EPIDURAL; INFILTRATION; INTRACAUDAL; PERINEURAL AS NEEDED
Status: DISCONTINUED | OUTPATIENT
Start: 2025-03-14 | End: 2025-03-15 | Stop reason: SURG

## 2025-03-14 RX ORDER — CITRIC ACID/SODIUM CITRATE 334-500MG
30 SOLUTION, ORAL ORAL ONCE
Status: COMPLETED | OUTPATIENT
Start: 2025-03-14 | End: 2025-03-14

## 2025-03-14 RX ORDER — KETOROLAC TROMETHAMINE 30 MG/ML
30 INJECTION, SOLUTION INTRAMUSCULAR; INTRAVENOUS ONCE
Status: COMPLETED | OUTPATIENT
Start: 2025-03-14 | End: 2025-03-15

## 2025-03-14 RX ORDER — FENTANYL/ROPIVACAINE/NS/PF 2MCG/ML-.2
10 PLASTIC BAG, INJECTION (ML) INJECTION CONTINUOUS
Refills: 0 | Status: DISCONTINUED | OUTPATIENT
Start: 2025-03-14 | End: 2025-03-16 | Stop reason: HOSPADM

## 2025-03-14 RX ORDER — PENICILLIN G 3000000 [IU]/50ML
3 INJECTION, SOLUTION INTRAVENOUS EVERY 4 HOURS
Status: DISCONTINUED | OUTPATIENT
Start: 2025-03-14 | End: 2025-03-15

## 2025-03-14 RX ORDER — PHYTONADIONE 1 MG/.5ML
INJECTION, EMULSION INTRAMUSCULAR; INTRAVENOUS; SUBCUTANEOUS
Status: ACTIVE
Start: 2025-03-14 | End: 2025-03-15

## 2025-03-14 RX ORDER — ACETAMINOPHEN 500 MG
1000 TABLET ORAL ONCE
Status: COMPLETED | OUTPATIENT
Start: 2025-03-14 | End: 2025-03-14

## 2025-03-14 RX ORDER — SODIUM CHLORIDE 0.9 % (FLUSH) 0.9 %
10 SYRINGE (ML) INJECTION AS NEEDED
Status: DISCONTINUED | OUTPATIENT
Start: 2025-03-14 | End: 2025-03-15

## 2025-03-14 RX ORDER — FAMOTIDINE 10 MG/ML
20 INJECTION, SOLUTION INTRAVENOUS ONCE AS NEEDED
Status: DISCONTINUED | OUTPATIENT
Start: 2025-03-14 | End: 2025-03-15

## 2025-03-14 RX ORDER — OXYTOCIN/0.9 % SODIUM CHLORIDE 30/500 ML
2-20 PLASTIC BAG, INJECTION (ML) INTRAVENOUS
Status: DISCONTINUED | OUTPATIENT
Start: 2025-03-14 | End: 2025-03-15

## 2025-03-14 RX ADMIN — SODIUM CHLORIDE, SODIUM LACTATE, POTASSIUM CHLORIDE, AND CALCIUM CHLORIDE 999 ML/HR: .6; .31; .03; .02 INJECTION, SOLUTION INTRAVENOUS at 09:04

## 2025-03-14 RX ADMIN — ONDANSETRON 4 MG: 2 INJECTION, SOLUTION INTRAMUSCULAR; INTRAVENOUS at 17:53

## 2025-03-14 RX ADMIN — SODIUM CHLORIDE, SODIUM LACTATE, POTASSIUM CHLORIDE, AND CALCIUM CHLORIDE 125 ML/HR: .6; .31; .03; .02 INJECTION, SOLUTION INTRAVENOUS at 22:55

## 2025-03-14 RX ADMIN — Medication 999 ML/HR: at 23:38

## 2025-03-14 RX ADMIN — ONDANSETRON 4 MG: 2 INJECTION, SOLUTION INTRAMUSCULAR; INTRAVENOUS at 13:38

## 2025-03-14 RX ADMIN — LIDOCAINE HYDROCHLORIDE 4 ML: 15 INJECTION, SOLUTION EPIDURAL; INFILTRATION; INTRACAUDAL; PERINEURAL at 09:03

## 2025-03-14 RX ADMIN — SODIUM CHLORIDE, SODIUM LACTATE, POTASSIUM CHLORIDE, AND CALCIUM CHLORIDE 125 ML/HR: .6; .31; .03; .02 INJECTION, SOLUTION INTRAVENOUS at 04:08

## 2025-03-14 RX ADMIN — LIDOCAINE HYDROCHLORIDE,EPINEPHRINE BITARTRATE 5 ML: 20; .005 INJECTION, SOLUTION EPIDURAL; INFILTRATION; INTRACAUDAL; PERINEURAL at 23:14

## 2025-03-14 RX ADMIN — AZITHROMYCIN MONOHYDRATE 500 MG: 500 INJECTION, POWDER, LYOPHILIZED, FOR SOLUTION INTRAVENOUS at 23:21

## 2025-03-14 RX ADMIN — PENICILLIN G 3 MILLION UNITS: 3000000 INJECTION, SOLUTION INTRAVENOUS at 12:06

## 2025-03-14 RX ADMIN — SODIUM CHLORIDE, SODIUM LACTATE, POTASSIUM CHLORIDE, AND CALCIUM CHLORIDE 999 ML/HR: .6; .31; .03; .02 INJECTION, SOLUTION INTRAVENOUS at 11:38

## 2025-03-14 RX ADMIN — PENICILLIN G 3 MILLION UNITS: 3000000 INJECTION, SOLUTION INTRAVENOUS at 20:52

## 2025-03-14 RX ADMIN — ACETAMINOPHEN 1000 MG: 500 TABLET, FILM COATED ORAL at 23:07

## 2025-03-14 RX ADMIN — EPHEDRINE SULFATE 5 MG: 50 INJECTION INTRAVENOUS at 11:42

## 2025-03-14 RX ADMIN — DROPERIDOL 0.62 MG: 2.5 INJECTION, SOLUTION INTRAMUSCULAR; INTRAVENOUS at 23:53

## 2025-03-14 RX ADMIN — Medication 10 ML/HR: at 17:51

## 2025-03-14 RX ADMIN — Medication 2 MILLI-UNITS/MIN: at 04:44

## 2025-03-14 RX ADMIN — PENICILLIN G 3 MILLION UNITS: 3000000 INJECTION, SOLUTION INTRAVENOUS at 08:06

## 2025-03-14 RX ADMIN — FAMOTIDINE 20 MG: 10 INJECTION INTRAVENOUS at 23:07

## 2025-03-14 RX ADMIN — LIDOCAINE HYDROCHLORIDE AND EPINEPHRINE 2.5 ML: 10; 10 INJECTION, SOLUTION INFILTRATION; PERINEURAL at 09:12

## 2025-03-14 RX ADMIN — LIDOCAINE HYDROCHLORIDE AND EPINEPHRINE 4.5 ML: 10; 10 INJECTION, SOLUTION INFILTRATION; PERINEURAL at 09:09

## 2025-03-14 RX ADMIN — ONDANSETRON 4 MG: 2 INJECTION, SOLUTION INTRAMUSCULAR; INTRAVENOUS at 23:07

## 2025-03-14 RX ADMIN — LIDOCAINE HYDROCHLORIDE,EPINEPHRINE BITARTRATE 5 ML: 20; .005 INJECTION, SOLUTION EPIDURAL; INFILTRATION; INTRACAUDAL; PERINEURAL at 23:27

## 2025-03-14 RX ADMIN — SODIUM CHLORIDE 5 MILLION UNITS: 900 INJECTION INTRAVENOUS at 04:09

## 2025-03-14 RX ADMIN — LIDOCAINE HYDROCHLORIDE,EPINEPHRINE BITARTRATE 5 ML: 20; .005 INJECTION, SOLUTION EPIDURAL; INFILTRATION; INTRACAUDAL; PERINEURAL at 23:15

## 2025-03-14 RX ADMIN — Medication 10 ML/HR: at 09:18

## 2025-03-14 RX ADMIN — Medication 30 ML: at 23:07

## 2025-03-14 RX ADMIN — CEFAZOLIN 2000 MG: 2 INJECTION, POWDER, FOR SOLUTION INTRAMUSCULAR; INTRAVENOUS at 23:07

## 2025-03-14 RX ADMIN — PENICILLIN G 3 MILLION UNITS: 3000000 INJECTION, SOLUTION INTRAVENOUS at 16:13

## 2025-03-14 RX ADMIN — SODIUM CHLORIDE, SODIUM LACTATE, POTASSIUM CHLORIDE, AND CALCIUM CHLORIDE 125 ML/HR: .6; .31; .03; .02 INJECTION, SOLUTION INTRAVENOUS at 15:25

## 2025-03-14 RX ADMIN — SODIUM CHLORIDE 300 ML: 9 INJECTION, SOLUTION INTRAVENOUS at 15:00

## 2025-03-14 NOTE — PLAN OF CARE
Problem: Adult Inpatient Plan of Care  Goal: Plan of Care Review  Outcome: Progressing  Flowsheets (Taken 3/14/2025 0505)  Progress: improving  Outcome Evaluation: Pt presented with contractions and ruptured membranes. Ctx q2-4. Pt rating pain 3/10. Augmenting with pitocin  Plan of Care Reviewed With: patient  Goal: Patient-Specific Goal (Individualized)  Outcome: Progressing  Flowsheets (Taken 3/14/2025 0505)  Patient/Family-Specific Goals (Include Timeframe): Pt will verbalize understanding of pain management options available to her by end of shift  Individualized Care Needs: nonpharmologic pain management methods  Anxieties, Fears or Concerns: NCB  Goal: Absence of Hospital-Acquired Illness or Injury  Outcome: Progressing  Intervention: Identify and Manage Fall Risk  Recent Flowsheet Documentation  Taken 3/14/2025 0422 by Gisel Patel RN  Safety Promotion/Fall Prevention: safety round/check completed  Goal: Optimal Comfort and Wellbeing  Outcome: Progressing  Intervention: Provide Person-Centered Care  Recent Flowsheet Documentation  Taken 3/14/2025 0422 by Gisel Patel RN  Trust Relationship/Rapport:   care explained   choices provided   emotional support provided   empathic listening provided  Goal: Readiness for Transition of Care  Outcome: Progressing     Problem: Labor  Goal: Hemostasis  Outcome: Progressing  Goal: Stable Fetal Wellbeing  Outcome: Progressing  Goal: Effective Progression to Delivery  Outcome: Progressing  Goal: Absence of Infection Signs and Symptoms  Outcome: Progressing  Goal: Acceptable Pain Control  Outcome: Progressing  Goal: Normal Uterine Contraction Pattern  Outcome: Progressing   Goal Outcome Evaluation:  Plan of Care Reviewed With: patient        Progress: improving  Outcome Evaluation: Pt presented with contractions and ruptured membranes. Ctx q2-4. Pt rating pain 3/10. Augmenting with pitocin

## 2025-03-14 NOTE — OBED NOTES
[facility]  [facility] 2000  MRN: [id number]  CSN: [id number]    OB ED Provider Note    Subjective   Chief Complaint   Patient presents with    Rupture of Membranes      39.3 weeks presents with SROM of clear fluid at 0100. Denies any bleeding, reports occasional ctx. +Fm     Helen Colon is a 24 y.o. year old  with an Estimated Date of Delivery: 3/18/25 currently at 39w3d presenting with SROM of clear fluid at 0100. She is only feeling occasional ctx, no bleeding. She endorses normal fetal movement.    Prenatal care has been with Dr. Cole.  It has been benign.    OB History    Para Term  AB Living   1 0 0 0 0 0   SAB IAB Ectopic Molar Multiple Live Births   0 0 0 0 0 0      # Outcome Date GA Lbr Kahlil/2nd Weight Sex Type Anes PTL Lv   1 Current              No past medical history on file.  No past surgical history on file.  No current facility-administered medications for this encounter.    No Known Allergies  Social History    Tobacco Use      Smoking status: Never      Smokeless tobacco: Never    Review of Systems      Objective   Temp 98.2 °F (36.8 °C) (Oral)   Resp 16   Wt 73 kg (161 lb)   LMP 2024 (Exact Date)   SpO2 99%   Breastfeeding Yes   BMI 30.63 kg/m²   General: well developed; well nourished  no acute distress  mentation appropriate   Abdomen: soft, non-tender; no masses  gravid    FHT's: 135, moderate, +accels, no decels; Category I       Cervix: was checked (by RN): 2 cm / 60 % / -2   Presentation: cephalic   Contractions: irregular   Chest: Unlabored respirations    CV:  normal rate, regular rhythm,  no murmurs, rubs, or gallops   Ext:   No C/C/E   Back: CVA tenderness is absent bilateral        Prenatal Labs  Lab Results   Component Value Date    HGB 11.2 2025    RUBELLAABIGG 1.95 2024    HEPBSAG Negative 2024    ABSCRN Negative 2024    [id number] Non Reactive 2024    GCT 89 2025    STREPGPB Positive (A)  02/18/2025    URINECX Final report 11/04/2024    CHLAMNAA Negative 11/04/2024    NGONORRHON Negative 11/04/2024          Assessment and Plan  IUP at 39w3d with SROM of clear fluid  VSS, afebrile, SVE 2/60  GBS positive  Fetal status reassuring, Category I         Catrachita Li MD  3/14/2025  03:45 EDT

## 2025-03-14 NOTE — PROGRESS NOTES
To bedside for prolonged deceleration to 50s. SVE 6/90/0 - FSE placed and amnioinfusion started. Recovery to prior baseline of 120s. Tolerated 2 contractions well.    Reviewed with patient indication for  section should NRFHT continue. For now, FHT recovering and will continue with pitocin induction following 20-30 minutes of category 1 tracing.    Rosamaria Byrd MD  3/14/2025 15:10 EDT

## 2025-03-14 NOTE — ANESTHESIA PREPROCEDURE EVALUATION
Anesthesia Evaluation     Patient summary reviewed                Airway   Mallampati: II  No difficulty expected  Dental - normal exam     Pulmonary - negative pulmonary ROS   (-) not a smoker  Cardiovascular - negative cardio ROS        Neuro/Psych- negative ROS  GI/Hepatic/Renal/Endo - negative ROS     Musculoskeletal (-) negative ROS    Abdominal    Substance History      OB/GYN    (+) Pregnant        Other                    Anesthesia Plan    ASA 2     epidural     (  )    Anesthetic plan, risks, benefits, and alternatives have been provided, discussed and informed consent has been obtained with: patient.    CODE STATUS:    Code Status (Patient has no pulse and is not breathing): CPR (Attempt to Resuscitate)  Medical Interventions (Patient has pulse or is breathing): Full Support  Level Of Support Discussed With: Patient

## 2025-03-14 NOTE — ANESTHESIA PROCEDURE NOTES
Labor Epidural      Patient reassessed immediately prior to procedure    Patient location during procedure: OB  Performed By  Anesthesiologist: Claudia Montgomery MD  Preanesthetic Checklist  Completed: patient identified, IV checked, site marked, risks and benefits discussed, surgical consent, monitors and equipment checked, pre-op evaluation and timeout performed  Prep:  Pt Position:sitting  Sterile Tech:cap, gloves, mask and sterile barrier  Prep:chlorhexidine gluconate and isopropyl alcohol  Monitoring:blood pressure monitoring and EKG  Epidural Block Procedure:  Approach:midline  Guidance:landmark technique  Location:L4-L5  Needle Type:Tuohy  Needle Gauge:17  Loss of Resistance Medium: saline  Cath Depth at skin:11 cm  Paresthesia: none  Aspiration:negative  Test Dose:negative  Number of Attempts: 1  Post Assessment:  Dressing:occlusive dressing applied and secured with tape  Pt Tolerance:patient tolerated the procedure well with no apparent complications  Complications:no

## 2025-03-14 NOTE — H&P
[facility]  Obstetric History and Physical     Chief Complaint: leakage of fluid    Subjective     Patient is a 24 y.o. female  currently at 39w3d who presents with leakage of fluid starting 0100. Fluid was clear. Reporting occasional contractions on admission. Denies vaginal bleeding or decreased fetal movement.    Her prenatal care is notable for GBS+ and BMI 30.  Her previous obstetric/gynecological history is unremarkable.    The below elements of history were reviewed upon admission:     Past OB History:       OB History    Para Term  AB Living   1 0 0 0 0 0   SAB IAB Ectopic Molar Multiple Live Births   0 0 0 0 0 0               Past Medical History: History reviewed. No pertinent past medical history.     Past Surgical History History reviewed. No pertinent surgical history.     Family History: Family History   Problem Relation Age of Onset    Diabetes Father     Prostate cancer Paternal Grandfather 50    Ovarian cancer Paternal Aunt 38        pt will check      Social History:  reports that she has never smoked. She has never used smokeless tobacco.   reports that she does not currently use alcohol.   reports no history of drug use.        General ROS: Pertinent items are noted in HPI    Objective      Vitals:     Vitals:    25 0731 25 0806 25 0826 25 0831   BP: 116/80   115/81   BP Location:       Patient Position:       Pulse: 68   107   Resp:  16 16    Temp:       TempSrc:       SpO2:       Weight:       Height:           Fetal Heart Rate Assessment:   Category 1, 135 baseline, moderate variability, positive 15x15 accelerations, no decelerations    [place]:   irregular     Physical Exam:     General Appearance:    Alert, cooperative, in no acute distress   Abdomen:     Soft, non-tender, no guarding, no rebound tenderness,       EFW 7#0oz - 7#8oz   Pelvic Exam:    Pelvis adequate.    Presentation: Vertex    Cervix: was checked (by RN): 3 cm / 75% % / -2    Extremities:   Moves all extremities well, no edema, no cyanosis, no              redness   Skin:   No bleeding, bruising or rash   Neurologic:   No focal neurologic defect          Laboratory Results:   Lab Results (last 48 hours)       Procedure Component Value Units Date/Time    URINE DRUG SCREEN PLUS BUPRENORPHINE - [[id number]]  (Abnormal) Collected: 03/14/25 0415     Updated: 03/14/25 0502    Narrative:      The following orders were created for panel order URINE DRUG SCREEN PLUS BUPRENORPHINE -.  Procedure                               Abnormality         Status                     ---------                               -----------         ------                     Urine Drug Screen - Urin...[[id number]]  Abnormal            Final result               Buprenorphine Screen Uri...[[id number]]                                                   Please view results for these tests on the individual orders.    Urine Drug Screen - Urine, Clean Catch [[id number]]  (Abnormal) Collected: 03/14/25 0415    Specimen: Urine, Clean Catch Updated: 03/14/25 0501     THC, Screen, Urine Positive     Phencyclidine (PCP), Urine Negative     Cocaine Screen, Urine Negative     Methamphetamine, Ur Negative     Opiate Screen Negative     Amphetamine Screen, Urine Negative     Benzodiazepine Screen, Urine Negative     Tricyclic Antidepressants Screen Negative     Methadone Screen, Urine Negative     Barbiturates Screen, Urine Negative     Oxycodone Screen, Urine Negative     Buprenorphine, Screen, Urine Negative    Narrative:      Cutoff For Drugs Screened:    Amphetamines               500 ng/ml  Barbiturates               200 ng/ml  Benzodiazepines            150 ng/ml  Cocaine                    150 ng/ml  Methadone                  200 ng/ml  Opiates                    100 ng/ml  Phencyclidine               25 ng/ml  THC                         50 ng/ml  Methamphetamine            500 ng/ml  Tricyclic Antidepressants  300  ng/ml  Oxycodone                  100 ng/ml  Buprenorphine               10 ng/ml    The normal value for all drugs tested is negative. This report includes unconfirmed screening results, with the cutoff values listed, to be used for medical treatment purposes only.  Unconfirmed results must not be used for non-medical purposes such as employment or legal testing.  Clinical consideration should be applied to any drug of abuse test, particularly when unconfirmed results are used.      Cannabinoids, Conf, MS, UR - Urine, Clean Catch [[id number]] Collected: 03/14/25 0415    Specimen: Urine, Clean Catch Updated: 03/14/25 0456    Treponema pallidum AB w/Reflex RPR [[id number]]  (Normal) Collected: 03/14/25 0407    Specimen: Blood Updated: 03/14/25 0446     Treponemal AB Total Non-Reactive    Narrative:      Reactive results will reflex RPR testing.    Hepatitis C Antibody [[id number]]  (Normal) Collected: 03/14/25 0407    Specimen: Blood Updated: 03/14/25 0445     Hepatitis C Ab Non-Reactive    Fentanyl, Urine - Urine, Clean Catch [[id number]]  (Normal) Collected: 03/14/25 0415    Specimen: Urine, Clean Catch Updated: 03/14/25 0444     Fentanyl, Urine Negative    Narrative:      Negative Threshold:      Fentanyl 5 ng/mL     The normal value for the drug tested is negative. This report includes final unconfirmed screening results to be used for medical treatment purposes only. Unconfirmed results must not be used for non-medical purposes such as employment or legal testing. Clinical consideration should be applied to any drug of abuse test, particularly when unconfirmed results are used.           CBC (No Diff) [[id number]]  (Normal) Collected: 03/14/25 0407    Specimen: Blood Updated: 03/14/25 0417     WBC 10.09 10*3/mm3      RBC 4.24 10*6/mm3      Hemoglobin 12.3 g/dL      Hematocrit 36.2 %      MCV 85.4 fL      MCH 29.0 pg      MCHC 34.0 g/dL      RDW 14.5 %      RDW-SD 45.3 fl      MPV 11.2 fL      Platelets 187 10*3/mm3                 Assessment & Plan     Principal Problem:    Normal labor  Active Problems:    Late prenatal care affecting pregnancy in second trimester    GBS (group B Streptococcus carrier), +RV culture, currently pregnant    Obesity (BMI 30-39.9)    Rupture of membranes with delay of delivery    Marijuana use     Assessment:  1.  Intrauterine pregnancy at 39w3d gestation.    2.  Induction of labor due to SROM  3.  GBS status:Positive    Plan:  1. Pitocin induction. and PCN for GBS.   2. Plan of care has been reviewed with patient.  3.  Risks, benefits of treatment plan have been discussed.  4.  All questions have been answered.     Rosamaria Byrd MD   3/14/2025   08:56 EDT

## 2025-03-14 NOTE — PROGRESS NOTES
To bedside for recurrent variable decelerations persisting despite maternal repositioning. SVE 5/90/-1 - IUPC placed without difficulty. Will utilize amnioinfusion if needed with 300cc bolus    Rosamaria Byrd MD  3/14/2025 14:49 EDT

## 2025-03-15 PROCEDURE — 25010000002 KETOROLAC TROMETHAMINE PER 15 MG: Performed by: STUDENT IN AN ORGANIZED HEALTH CARE EDUCATION/TRAINING PROGRAM

## 2025-03-15 PROCEDURE — 25010000002 MORPHINE PER 10 MG: Performed by: NURSE ANESTHETIST, CERTIFIED REGISTERED

## 2025-03-15 PROCEDURE — 25010000002 HYDROMORPHONE PER 4 MG: Performed by: NURSE ANESTHETIST, CERTIFIED REGISTERED

## 2025-03-15 PROCEDURE — 59510 CESAREAN DELIVERY: CPT | Performed by: STUDENT IN AN ORGANIZED HEALTH CARE EDUCATION/TRAINING PROGRAM

## 2025-03-15 RX ORDER — METHYLERGONOVINE MALEATE 0.2 MG/ML
200 INJECTION INTRAVENOUS ONCE AS NEEDED
Status: DISCONTINUED | OUTPATIENT
Start: 2025-03-15 | End: 2025-03-16 | Stop reason: HOSPADM

## 2025-03-15 RX ORDER — DIPHENHYDRAMINE HYDROCHLORIDE 50 MG/ML
25 INJECTION, SOLUTION INTRAMUSCULAR; INTRAVENOUS EVERY 4 HOURS PRN
Status: DISCONTINUED | OUTPATIENT
Start: 2025-03-15 | End: 2025-03-16 | Stop reason: HOSPADM

## 2025-03-15 RX ORDER — MORPHINE SULFATE 1 MG/ML
INJECTION, SOLUTION EPIDURAL; INTRATHECAL; INTRAVENOUS AS NEEDED
Status: DISCONTINUED | OUTPATIENT
Start: 2025-03-15 | End: 2025-03-15 | Stop reason: SURG

## 2025-03-15 RX ORDER — OXYTOCIN/0.9 % SODIUM CHLORIDE 30/500 ML
125 PLASTIC BAG, INJECTION (ML) INTRAVENOUS ONCE AS NEEDED
Status: COMPLETED | OUTPATIENT
Start: 2025-03-15 | End: 2025-03-15

## 2025-03-15 RX ORDER — ONDANSETRON 4 MG/1
4 TABLET, ORALLY DISINTEGRATING ORAL EVERY 4 HOURS PRN
Status: DISCONTINUED | OUTPATIENT
Start: 2025-03-15 | End: 2025-03-16 | Stop reason: HOSPADM

## 2025-03-15 RX ORDER — ACETAMINOPHEN 325 MG/1
650 TABLET ORAL EVERY 6 HOURS PRN
Status: DISCONTINUED | OUTPATIENT
Start: 2025-03-15 | End: 2025-03-16 | Stop reason: HOSPADM

## 2025-03-15 RX ORDER — OXYCODONE HYDROCHLORIDE 5 MG/1
5 TABLET ORAL EVERY 4 HOURS PRN
Status: DISCONTINUED | OUTPATIENT
Start: 2025-03-15 | End: 2025-03-16 | Stop reason: HOSPADM

## 2025-03-15 RX ORDER — SIMETHICONE 80 MG
80 TABLET,CHEWABLE ORAL 4 TIMES DAILY PRN
Status: DISCONTINUED | OUTPATIENT
Start: 2025-03-15 | End: 2025-03-16 | Stop reason: HOSPADM

## 2025-03-15 RX ORDER — OXYTOCIN/0.9 % SODIUM CHLORIDE 30/500 ML
125 PLASTIC BAG, INJECTION (ML) INTRAVENOUS ONCE AS NEEDED
Status: DISCONTINUED | OUTPATIENT
Start: 2025-03-15 | End: 2025-03-16 | Stop reason: HOSPADM

## 2025-03-15 RX ORDER — NALOXONE HCL 0.4 MG/ML
0.2 VIAL (ML) INJECTION
Status: DISCONTINUED | OUTPATIENT
Start: 2025-03-15 | End: 2025-03-16 | Stop reason: HOSPADM

## 2025-03-15 RX ORDER — HYDROXYZINE HYDROCHLORIDE 50 MG/1
50 TABLET, FILM COATED ORAL EVERY 6 HOURS PRN
Status: DISCONTINUED | OUTPATIENT
Start: 2025-03-15 | End: 2025-03-16 | Stop reason: HOSPADM

## 2025-03-15 RX ORDER — PRENATAL VIT/IRON FUM/FOLIC AC 27MG-0.8MG
1 TABLET ORAL DAILY
Status: DISCONTINUED | OUTPATIENT
Start: 2025-03-15 | End: 2025-03-16 | Stop reason: HOSPADM

## 2025-03-15 RX ORDER — CARBOPROST TROMETHAMINE 250 UG/ML
250 INJECTION, SOLUTION INTRAMUSCULAR ONCE
Status: DISCONTINUED | OUTPATIENT
Start: 2025-03-15 | End: 2025-03-16 | Stop reason: HOSPADM

## 2025-03-15 RX ORDER — IBUPROFEN 600 MG/1
600 TABLET, FILM COATED ORAL EVERY 6 HOURS
Status: DISCONTINUED | OUTPATIENT
Start: 2025-03-16 | End: 2025-03-16

## 2025-03-15 RX ORDER — HYDROMORPHONE HYDROCHLORIDE 1 MG/ML
0.5 INJECTION, SOLUTION INTRAMUSCULAR; INTRAVENOUS; SUBCUTANEOUS
Status: DISCONTINUED | OUTPATIENT
Start: 2025-03-15 | End: 2025-03-15 | Stop reason: HOSPADM

## 2025-03-15 RX ORDER — MISOPROSTOL 200 UG/1
1000 TABLET ORAL ONCE
Status: DISCONTINUED | OUTPATIENT
Start: 2025-03-15 | End: 2025-03-16 | Stop reason: HOSPADM

## 2025-03-15 RX ORDER — DROPERIDOL 2.5 MG/ML
0.62 INJECTION, SOLUTION INTRAMUSCULAR; INTRAVENOUS
Status: DISCONTINUED | OUTPATIENT
Start: 2025-03-15 | End: 2025-03-16 | Stop reason: HOSPADM

## 2025-03-15 RX ORDER — KETOROLAC TROMETHAMINE 15 MG/ML
15 INJECTION, SOLUTION INTRAMUSCULAR; INTRAVENOUS EVERY 6 HOURS
Status: DISCONTINUED | OUTPATIENT
Start: 2025-03-16 | End: 2025-03-16

## 2025-03-15 RX ORDER — HYDROCORTISONE 25 MG/G
CREAM TOPICAL 3 TIMES DAILY PRN
Status: DISCONTINUED | OUTPATIENT
Start: 2025-03-15 | End: 2025-03-16 | Stop reason: HOSPADM

## 2025-03-15 RX ORDER — CALCIUM CARBONATE 500 MG/1
1 TABLET, CHEWABLE ORAL EVERY 4 HOURS PRN
Status: DISCONTINUED | OUTPATIENT
Start: 2025-03-15 | End: 2025-03-16 | Stop reason: HOSPADM

## 2025-03-15 RX ORDER — DIPHENHYDRAMINE HCL 25 MG
25 CAPSULE ORAL EVERY 4 HOURS PRN
Status: DISCONTINUED | OUTPATIENT
Start: 2025-03-15 | End: 2025-03-16 | Stop reason: HOSPADM

## 2025-03-15 RX ORDER — ACETAMINOPHEN 500 MG
1000 TABLET ORAL EVERY 6 HOURS
Status: COMPLETED | OUTPATIENT
Start: 2025-03-15 | End: 2025-03-15

## 2025-03-15 RX ORDER — ACETAMINOPHEN 325 MG/1
650 TABLET ORAL EVERY 6 HOURS
Status: DISCONTINUED | OUTPATIENT
Start: 2025-03-16 | End: 2025-03-16 | Stop reason: HOSPADM

## 2025-03-15 RX ORDER — OXYCODONE HYDROCHLORIDE 10 MG/1
10 TABLET ORAL EVERY 4 HOURS PRN
Status: DISCONTINUED | OUTPATIENT
Start: 2025-03-15 | End: 2025-03-16 | Stop reason: HOSPADM

## 2025-03-15 RX ORDER — AMOXICILLIN 250 MG
1 CAPSULE ORAL 2 TIMES DAILY
Status: DISCONTINUED | OUTPATIENT
Start: 2025-03-15 | End: 2025-03-16 | Stop reason: HOSPADM

## 2025-03-15 RX ORDER — HYDROCODONE BITARTRATE AND ACETAMINOPHEN 5; 325 MG/1; MG/1
1 TABLET ORAL EVERY 4 HOURS PRN
Status: ACTIVE | OUTPATIENT
Start: 2025-03-15 | End: 2025-03-16

## 2025-03-15 RX ORDER — ONDANSETRON 2 MG/ML
4 INJECTION INTRAMUSCULAR; INTRAVENOUS ONCE AS NEEDED
Status: DISCONTINUED | OUTPATIENT
Start: 2025-03-15 | End: 2025-03-16 | Stop reason: HOSPADM

## 2025-03-15 RX ORDER — ALUMINA, MAGNESIA, AND SIMETHICONE 2400; 2400; 240 MG/30ML; MG/30ML; MG/30ML
15 SUSPENSION ORAL EVERY 4 HOURS PRN
Status: DISCONTINUED | OUTPATIENT
Start: 2025-03-15 | End: 2025-03-16 | Stop reason: HOSPADM

## 2025-03-15 RX ADMIN — KETOROLAC TROMETHAMINE 30 MG: 30 INJECTION, SOLUTION INTRAMUSCULAR at 01:01

## 2025-03-15 RX ADMIN — Medication 125 ML/HR: at 01:51

## 2025-03-15 RX ADMIN — SENNOSIDES AND DOCUSATE SODIUM 1 TABLET: 50; 8.6 TABLET ORAL at 08:47

## 2025-03-15 RX ADMIN — MORPHINE SULFATE 1 MG: 1 INJECTION, SOLUTION EPIDURAL; INTRATHECAL; INTRAVENOUS at 00:12

## 2025-03-15 RX ADMIN — KETOROLAC TROMETHAMINE 15 MG: 15 INJECTION, SOLUTION INTRAMUSCULAR; INTRAVENOUS at 15:09

## 2025-03-15 RX ADMIN — KETOROLAC TROMETHAMINE 15 MG: 15 INJECTION, SOLUTION INTRAMUSCULAR; INTRAVENOUS at 08:47

## 2025-03-15 RX ADMIN — PRENATAL VITAMINS-IRON FUMARATE 27 MG IRON-FOLIC ACID 0.8 MG TABLET 1 TABLET: at 08:47

## 2025-03-15 RX ADMIN — ACETAMINOPHEN 1000 MG: 500 TABLET, FILM COATED ORAL at 17:49

## 2025-03-15 RX ADMIN — MORPHINE SULFATE 2 MG: 1 INJECTION, SOLUTION EPIDURAL; INTRATHECAL; INTRAVENOUS at 00:04

## 2025-03-15 RX ADMIN — ACETAMINOPHEN 1000 MG: 500 TABLET, FILM COATED ORAL at 06:37

## 2025-03-15 RX ADMIN — ACETAMINOPHEN 1000 MG: 500 TABLET, FILM COATED ORAL at 12:15

## 2025-03-15 RX ADMIN — HYDROMORPHONE HYDROCHLORIDE 0.5 MG: 1 INJECTION, SOLUTION INTRAMUSCULAR; INTRAVENOUS; SUBCUTANEOUS at 01:49

## 2025-03-15 RX ADMIN — KETOROLAC TROMETHAMINE 15 MG: 15 INJECTION, SOLUTION INTRAMUSCULAR; INTRAVENOUS at 20:26

## 2025-03-15 RX ADMIN — SENNOSIDES AND DOCUSATE SODIUM 1 TABLET: 50; 8.6 TABLET ORAL at 20:26

## 2025-03-15 RX ADMIN — ACETAMINOPHEN 1000 MG: 500 TABLET, FILM COATED ORAL at 23:32

## 2025-03-15 NOTE — PLAN OF CARE
Problem: Adult Inpatient Plan of Care  Goal: Plan of Care Review  Outcome: Progressing  Flowsheets (Taken 3/15/2025 014)  Progress: improving  Outcome Evaluation:  delivery ar 2337 for failure to progress. Bleeding WDL. Nausea controlled. MILADYS with partner. Giving pain meds.  Goal: Patient-Specific Goal (Individualized)  Outcome: Progressing  Flowsheets (Taken 3/15/2025 014)  Patient/Family-Specific Goals (Include Timeframe): healthy mom and baby upon discharge  Individualized Care Needs:  section and feeding  Anxieties, Fears or Concerns: first baby  Goal: Absence of Hospital-Acquired Illness or Injury  Outcome: Progressing  Intervention: Prevent and Manage VTE (Venous Thromboembolism) Risk  Description: Assess for VTE (venous thromboembolism) risk.Promote early mobilization; encourage both active and passive leg exercises, if unable to ambulate.Initiate and maintain compression or other therapy, as indicated, based on identified risk in accordance with organizational protocol and provider order.Recognize the patient's individual risk for bleeding before initiating pharmacologic thromboprophylaxis.  Recent Flowsheet Documentation  Taken 3/15/2025 0115 by Baudilio Wright RN  VTE Prevention/Management: SCDs (sequential compression devices) on  Taken 3/15/2025 0100 by Baudilio Wright RN  VTE Prevention/Management: SCDs (sequential compression devices) on  Taken 3/15/2025 0045 by Baudilio Wright RN  VTE Prevention/Management: SCDs (sequential compression devices) on  Taken 3/15/2025 0030 by Baudilio Wright RN  VTE Prevention/Management: SCDs (sequential compression devices) on  Goal: Optimal Comfort and Wellbeing  Outcome: Progressing  Intervention: Monitor Pain and Promote Comfort  Description: Assess pain level, treatment efficacy and patient response at regular intervals using a consistent pain scale.Consider the presence and impact of preexisting chronic pain.Encourage patient and caregiver involvement in  pain assessment, interventions and safety measures.Promote activity; balance with sleep and rest to enhance healing.  Recent Flowsheet Documentation  Taken 3/15/2025 011 by Baudilio Wright RN  Pain Management Interventions: no interventions per patient request  Taken 3/15/2025 010 by Baudilio Wright RN  Pain Management Interventions: pain medication given  Goal: Readiness for Transition of Care  Outcome: Progressing  Goal: Plan of Care Review  Outcome: Progressing  Flowsheets (Taken 3/15/2025 014)  Progress: improving  Outcome Evaluation:  delivery ar 2337 for failure to progress. Bleeding WDL. Nausea controlled. MILADYS with partner. Giving pain meds.  Goal: Patient-Specific Goal (Individualized)  Outcome: Progressing  Flowsheets (Taken 3/15/2025 014)  Patient/Family-Specific Goals (Include Timeframe): healthy mom and baby upon discharge  Individualized Care Needs:  section and feeding  Anxieties, Fears or Concerns: first baby  Goal: Absence of Hospital-Acquired Illness or Injury  Outcome: Progressing  Intervention: Prevent and Manage VTE (Venous Thromboembolism) Risk  Description: Assess for VTE (venous thromboembolism) risk.Promote early mobilization; encourage both active and passive leg exercises, if unable to ambulate.Initiate and maintain compression or other therapy, as indicated, based on identified risk in accordance with organizational protocol and provider order.Recognize the patient's individual risk for bleeding before initiating pharmacologic thromboprophylaxis.  Recent Flowsheet Documentation  Taken 3/15/2025 0115 by Baudilio Wright RN  VTE Prevention/Management: SCDs (sequential compression devices) on  Taken 3/15/2025 0100 by Baudilio Wright RN  VTE Prevention/Management: SCDs (sequential compression devices) on  Taken 3/15/2025 0045 by Baudilio Wright RN  VTE Prevention/Management: SCDs (sequential compression devices) on  Taken 3/15/2025 0030 by Baudilio Wright RN  VTE Prevention/Management:  SCDs (sequential compression devices) on  Goal: Optimal Comfort and Wellbeing  Outcome: Progressing  Intervention: Monitor Pain and Promote Comfort  Description: Assess pain level, treatment efficacy and patient response at regular intervals using a consistent pain scale.Consider the presence and impact of preexisting chronic pain.Encourage patient and caregiver involvement in pain assessment, interventions and safety measures.Promote activity; balance with sleep and rest to enhance healing.  Recent Flowsheet Documentation  Taken 3/15/2025 011 by Baudilio Wright RN  Pain Management Interventions: no interventions per patient request  Taken 3/15/2025 0100 by Baudilio Wright RN  Pain Management Interventions: pain medication given  Goal: Readiness for Transition of Care  Outcome: Progressing   Goal Outcome Evaluation:           Progress: improving  Outcome Evaluation:  delivery ar 2337 for failure to progress. Bleeding WDL. Nausea controlled. MILADYS with partner. Giving pain meds.

## 2025-03-15 NOTE — PROGRESS NOTES
[facility]   PROGRESS NOTE    Post-Op Day 1 S/P     Subjective   CC: post  section    Patient reports:  Pain is controlled with Toradol, Motrin and Tylenol.  Patient has no complaints.  She is tolerating p.o.  Vargas catheter is still in.  Her mood is good.    Review of systems- no shortness of breath, nausea or vomiting or leg pain.    Objective      Vitals: Vital Signs Range for the last 24 hours  Temperature: Temp:  [97.5 °F (36.4 °C)-99 °F (37.2 °C)] 98.2 °F (36.8 °C)       BP: BP: ()/(32-93) 100/58   Pulse: Heart Rate:  [] 84   Respirations: Resp:  [16-17] 17                            Physical exam   General-  no acute distress, conversant    Lungs- respirations unlabored   Abdomen- soft, nontender, nondistended.  Fundus is firm.   Incision -dressing is dry   Lower extremities- nontender bilaterally    Results from last 7 days   Lab Units 25  0407   WBC 10*3/mm3 10.09   HEMOGLOBIN g/dL 12.3   HEMATOCRIT % 36.2   PLATELETS 10*3/mm3 187         Assessment & Plan        Normal labor    Late prenatal care affecting pregnancy in second trimester    GBS (group B Streptococcus carrier), +RV culture, currently pregnant    Obesity (BMI 30-39.9)    Rupture of membranes with delay of delivery    Marijuana use      Assessment:    Helen WOODS Story is Day 1  post-op from      Patient is doing well.  Vargas catheter is still in-removed today     Plan:  continue post op care, pain medication prn and encouraged ambulation.        Satnam Cole MD  3/15/2025  07:41 EDT

## 2025-03-15 NOTE — OP NOTE
[facility]   Obstetrics and Gynecology     3/15/2025    Patient:Helen Colon   MR#:[id number]     Section Procedure Note    Indications: failure to progress: arrest of dilation    Pre-operative Diagnosis:   Intrauterine pregnancy at 39w4d  Labor status: Spontaneous Onset of Labor    Normal labor    Late prenatal care affecting pregnancy in second trimester    GBS (group B Streptococcus carrier), +RV culture, currently pregnant    Obesity (BMI 30-39.9)    Rupture of membranes with delay of delivery    Marijuana use    Post-operative Diagnosis: same    Procedure:  Primary low transverse  section     Surgeon: Rosamaria Byrd MD     Assistants:  Christal Mabry CST    Anesthesia: Epidural anesthesia    Prenatal care problem list:  Patient Active Problem List   Diagnosis    Late prenatal care affecting pregnancy in second trimester    Pregnancy    GBS (group B Streptococcus carrier), +RV culture, currently pregnant    Normal labor    Obesity (BMI 30-39.9)    Rupture of membranes with delay of delivery    Marijuana use     Procedure Details   The patient was seen in the LDR preoperatively. The risks, benefits, complications, treatment options, and expected outcomes were discussed with the patient.  The patient concurred with the proposed plan, giving informed consent.  The site of surgery is discussed. The patient was taken to Operating Room # 1, identified as Helen Colon and the procedure verified as  Delivery. A Time Out was held and the above information confirmed. Antibiotic prophylaxis of ancef and azithromycin was given.    Epidural anesthesia was dosed just prior to arrival in OR. The patient was draped and prepped in the usual sterile manner. Adequate anesthesia was confirmed. A Pfannenstiel incision was made and carried down through the subcutaneous tissue to the fascia. Fascial incision was made and extended transversely. The peritoneum was identified and entered.  "Peritoneal incision was extended longitudinally. A low transverse uterine incision was made. Delivered from vertex presentation was a female fetus 2980 g (6 lb 9.1 oz) with Apgar scores of 8 at one minute and 9 at five minutes. After the umbilical cord was clamped and cut, cord blood was obtained for evaluation. The placenta was removed intact and appeared normal. The uterine outline, tubes and ovaries appeared normal.  The uterine incision was closed with running locked sutures of 0 vicryl followed by an imbricating layer with 0 monocryl. Approximately 3x1cm hematoma noted at left apex of hysterotomy and was noted not to be expanding. Hemostasis was observed. The gutters were cleared of clot.    Peritoneum was closed with 2-0 Vicryl in a running fashion incorporating the muscle in the closure    The fascia was then reapproximated with running sutures of 0 Vicryl. The deep subcutaneous layer was irrigated and then reapproximated with 3-0 Vicryl in 2 layers. The skin was reapproximated with stratafix monocryl in a subcuticular fashion.     Instrument, sponge, and needle counts were correct prior the abdominal closure and at the conclusion of the case. Patient tolerated the procedure well. Infant requiring PAP of room air at conclusion of case.    Findings:  Normal appearing uterus, fallopian tubes, and ovaries  Viable female infant    Estimated Blood Loss:  700cc    Calculated Blood Loss:   pending RN documentation           Specimens:  Placenta            Complications:  None; patient tolerated the procedure well.           Disposition: PACU - hemodynamically stable.           Condition: stable    Cord gases:  No results found for: \"PHCVEN\", \"BECVEN\"    Rosamaria Byrd MD  3/15/2025   00:29 EDT    "

## 2025-03-15 NOTE — PROGRESS NOTES
"Discharge Planning Assessment  [facility]     Patient Name: Helen Colon  MRN: [id number]  Today's Date: 3/15/2025    Admit Date: 3/14/2025    Plan: Infant may discharge to mother when medically ready. CSW will follow cord tox. SETH Lindo   Discharge Needs Assessment    No documentation.                  Discharge Plan       Row Name 03/15/25 1411       Plan    Plan Infant may discharge to mother when medically ready. CSW will follow cord tox. ENEDELIA LindoW    Plan Comments Mother: Helen Colon, MRN [id number]; Infant: MalclaudiasGirl \"Brickeys\" Story, MRN [id number]. CSW was consulted for \"late prenatal care started at 25 weeks. Mom positive for THC on admission 03/14/25\". Of note, mother's prenatal UDS was negative 11/24, and positive for THC on admission 3/14; infant's UDS was missed, and cord toxicology has been sent. CSW met with mother and father of infant/SO at bedside; FOB was asleep during the assessment. Mother verified her address, phone number, and insurance. Mother plans to add infant to her insurance plan. Mother reports having a car seat, crib, clothes, and diapers for infant. This is mother and father's first child. Mother's support system includes father of infant/SO, maternal grandmother of infant, paternal grandmother of infant, and friends. [facility], mother is comfortable scheduling appointments for infant, and has reliable transportation. Mother is not current with WIC. Mother shared she unknowingly ate THC candy about 3 days ago. CSW discussed infant's cord toxicology and mandated reporting to CPS if the cord is positive for THC or other substances. Mother voiced understanding. CSW spent time building rapport with mother and offered validation, support, and encouragement to her throughout the assessment. CSW provided a packet of resources to mother including: WIC, HANDS, transportation, infant supplies, counseling, online support groups, postpartum " mood and anxiety resources, and general community resources. Mother was polite and appropriate throughout the assessment, and denied having unmet needs at this time. CSW will follow infant's cord toxicology and complete mandated reporting to CPS if warranted. SETH Lindo                       Demographic Summary       Row Name 03/15/25 1410       General Information    Admission Type inpatient    Arrived From home    Referral Source nursing    Reason for Consult psychosocial concerns;substance use concerns;community resources    General Information Comments THC+ on admit, resources/support                   Functional Status       Row Name 03/15/25 1410       Mental Status    General Appearance WDL WDL                   Psychosocial       Row Name 03/15/25 1411       Behavior WDL    Behavior WDL WDL       Emotion Mood WDL    Emotion/Mood/Affect WDL WDL       Speech WDL    Speech WDL WDL       Perceptual State WDL    Perceptual State WDL WDL       Thought Process WDL    Thought Process WDL WDL       Intellectual Performance WDL    Intellectual Performance WDL WDL       Coping/Stress    Major Change/Loss/Stressor birth    Patient Personal Strengths able to adapt;expressive of needs;flexibility;future/goal oriented;positive attitude;strong support system    Sources of Support other family members;parent(s);significant other;friend(s)                   Abuse/Neglect       Row Name 03/15/25 1411       Personal Safety    Physical Signs of Abuse Present no                   Legal    No documentation.                  Substance Abuse       Row Name 03/15/25 1411       Substance Use    Substance Use Comment mom reports she unknowingly ate THC candy a few days ago                   Patient Forms    No documentation.                     LUCIA Carr

## 2025-03-15 NOTE — LACTATION NOTE
Pt called for assistance with latching baby. Pt had baby latched when LC went to room. Educated on importance of deep latching and ways to achieve it. Encouraged to BF at least every 2-3 hours for 10-15 min on each breast. Encouraged to call LC as needed.      Lactation Consult Note    Evaluation Completed: 3/15/2025 13:56 EDT  Patient Name: Helen Colon  :  2000  MRN:  [id number]     REFERRAL  INFORMATION:                          Date of Referral: 03/15/25   Person Making Referral: patient          DELIVERY HISTORY:        Skin to skin initiation date/time: 3/14/2025    Skin to skin end date/time:           MATERNAL ASSESSMENT:     Breast Shape: round (03/15/25 1350)  Breast Density: soft (03/15/25 135)  Areola: elastic (03/15/25 1350)  Nipples: everted (03/15/25 1350)                INFANT ASSESSMENT:  Information for the patient's :  Jerad Colon [[id number]]   No past medical history on file.  Feeding Readiness Cues: eager, rooting (03/15/25 1300)     Feeding Tolerance/Success: adequate pause for breath, alert for feeding, eager, rooting, strong suck, coordinated suck/swallow/breathing (03/15/25 1300)                                Infant Positioning: clutch/football (03/15/25 1300)        Effective Latch During Feeding: yes (03/15/25 1300)  Suck/Swallow/Breathing Coordination: present (03/15/25 1300)  Signs of Milk Transfer: deep jaw excursions noted (03/15/25 1300)      Latch: 2-->grasps breast, tongue down, lips flanged, rhythmic sucking (03/15/25 1300)  Audible Swallowin-->none (03/15/25 1300)  Type of Nipple: 2-->everted (after stimulation) (03/15/25 1300)  Comfort (Breast/Nipple): 2-->soft/nontender (03/15/25 1300)  Hold (Positioning): 1-->minimal assist, teach one side, mother does other, staff holds (03/15/25 1300)  Latch Score: 7 (03/15/25 1300)                   MATERNAL INFANT FEEDING:     Maternal Emotional State: receptive (03/15/25 1350)  Infant Positioning:  clutch/football (03/15/25 1350)   Signs of Milk Transfer: deep jaw excursions noted (03/15/25 1350)  Pain with Feeding: no (03/15/25 1350)                       Latch Assistance: verbal guidance offered (03/15/25 1350)                               EQUIPMENT TYPE:                                 BREAST PUMPING:          LACTATION REFERRALS:

## 2025-03-15 NOTE — PLAN OF CARE
Goal Outcome Evaluation:              Outcome Evaluation: VSS. Breastfeeding. Voiding without difficulty. Ambulating well.

## 2025-03-15 NOTE — PROGRESS NOTES
To bedside for SVE. Found to be unchanged at 7/90/0. She has been unchanged for 5 hours despite being ruptured and on pitocin. Discussed diagnosis of arrest of dilation and recommendation made for  delivery. Patient and family in agreement with plan. Consented as below. Plan for ancef and azithromycin for antibiotic prophylaxis.    The surgical procedure was discussed with the patient in detail.  I discussed the risks of the surgical procedure including, but not limited to the risk of pain, bleeding, infection, and damage to internal organs.  In exceedingly rare cases, death has been reported from surgical complications.     Rosamaria Byrd MD  3/14/2025 22:36 EDT

## 2025-03-15 NOTE — LACTATION NOTE
Pt reports baby is BF good so far. She denies questions. Encouraged to BF at least every 2-3 hours for 10-15 min on each breast and call LC as needed.    Lactation Consult Note    Evaluation Completed: 3/15/2025 13:34 EDT  Patient Name: Helen Colon  :  2000  MRN:  [id number]     REFERRAL  INFORMATION:                                         DELIVERY HISTORY:        Skin to skin initiation date/time: 3/14/2025    Skin to skin end date/time:           MATERNAL ASSESSMENT:                               INFANT ASSESSMENT:  Information for the patient's :  Jerad Colon [[id number]]   No past medical history on file.                                                                                                  MATERNAL INFANT FEEDING:                                                                       EQUIPMENT TYPE:                                 BREAST PUMPING:          LACTATION REFERRALS:

## 2025-03-15 NOTE — NURSING NOTE
Jamia CLARKE requested me to come remove patient's epidural line that got left in after being transferred up from labor and delivery. Dr Quintana from anesthesia was notified that this catheter remained in place and he verified with Dr Lamar, who was here last night, to make sure that there wasn't any reason this catheter needed to stay in. Dr Quintana approved for the catheter to come out after verifying the patient was not on anticoagulant therapy and her platelets were WNL. Catheter removed, blue tip intact, no complications, no bleeding at site noted. Patient instructed to notify nursing staff if any pain or bleeding occurs. Jamia CLARKE notified that I removed the line and documented it.

## 2025-03-15 NOTE — ANESTHESIA POSTPROCEDURE EVALUATION
"Patient: Helen Colon    Procedure Summary       Date: 25 Room / Location:  [facility]    Anesthesia Start: 0900 Anesthesia Stop: 03/15/25 0029    Procedure:  SECTION PRIMARY (Abdomen) Diagnosis: (Failure to progress)    Surgeons: Rosamaria Byrd MD Provider: Jules Bro MD    Anesthesia Type: epidural ASA Status: 2            Anesthesia Type: epidural    Vitals  Vitals Value Taken Time   BP 99/58 03/15/25 03:11   Temp 36.7 °C (98.1 °F) 03/15/25 03:11   Pulse 70 03/15/25 03:11   Resp 16 03/15/25 03:11   SpO2 96 % 03/15/25 03:11           Post Anesthesia Care and Evaluation    Patient location during evaluation: bedside  Patient participation: complete - patient participated  Level of consciousness: awake and alert  Pain management: adequate    Airway patency: patent  Anesthetic complications: No anesthetic complications  PONV Status: NA  Cardiovascular status: acceptable and hemodynamically stable  Respiratory status: acceptable  Hydration status: acceptable  Post Neuraxial Block status: No signs or symptoms of PDPH  Comments: BP 99/58 (BP Location: Right arm, Patient Position: Sitting)   Pulse 70   Temp 36.7 °C (98.1 °F) (Oral)   Resp 16   Ht 154.4 cm (60.79\")   Wt 73 kg (160 lb 15 oz)   LMP 2024 (Exact Date)   SpO2 96%   Breastfeeding Yes   BMI 30.62 kg/m²     "

## 2025-03-16 VITALS
WEIGHT: 160.94 LBS | TEMPERATURE: 97.5 F | OXYGEN SATURATION: 97 % | HEIGHT: 61 IN | BODY MASS INDEX: 30.39 KG/M2 | SYSTOLIC BLOOD PRESSURE: 98 MMHG | RESPIRATION RATE: 16 BRPM | DIASTOLIC BLOOD PRESSURE: 65 MMHG | HEART RATE: 64 BPM

## 2025-03-16 LAB
BASOPHILS # BLD AUTO: 0.03 10*3/MM3 (ref 0–0.2)
BASOPHILS NFR BLD AUTO: 0.3 % (ref 0–1.5)
DEPRECATED RDW RBC AUTO: 43.6 FL (ref 37–54)
EOSINOPHIL # BLD AUTO: 0.08 10*3/MM3 (ref 0–0.4)
EOSINOPHIL NFR BLD AUTO: 0.7 % (ref 0.3–6.2)
ERYTHROCYTE [DISTWIDTH] IN BLOOD BY AUTOMATED COUNT: 14.1 % (ref 12.3–15.4)
HCT VFR BLD AUTO: 24 % (ref 34–46.6)
HGB BLD-MCNC: 8.2 G/DL (ref 12–15.9)
IMM GRANULOCYTES # BLD AUTO: 0.09 10*3/MM3 (ref 0–0.05)
IMM GRANULOCYTES NFR BLD AUTO: 0.8 % (ref 0–0.5)
LYMPHOCYTES # BLD AUTO: 2.23 10*3/MM3 (ref 0.7–3.1)
LYMPHOCYTES NFR BLD AUTO: 18.9 % (ref 19.6–45.3)
MCH RBC QN AUTO: 29.4 PG (ref 26.6–33)
MCHC RBC AUTO-ENTMCNC: 34.2 G/DL (ref 31.5–35.7)
MCV RBC AUTO: 86 FL (ref 79–97)
MONOCYTES # BLD AUTO: 0.68 10*3/MM3 (ref 0.1–0.9)
MONOCYTES NFR BLD AUTO: 5.8 % (ref 5–12)
NEUTROPHILS NFR BLD AUTO: 73.5 % (ref 42.7–76)
NEUTROPHILS NFR BLD AUTO: 8.66 10*3/MM3 (ref 1.7–7)
PLATELET # BLD AUTO: 153 10*3/MM3 (ref 140–450)
PMV BLD AUTO: 11.1 FL (ref 6–12)
RBC # BLD AUTO: 2.79 10*6/MM3 (ref 3.77–5.28)
WBC NRBC COR # BLD AUTO: 11.77 10*3/MM3 (ref 3.4–10.8)

## 2025-03-16 PROCEDURE — 85025 COMPLETE CBC W/AUTO DIFF WBC: CPT | Performed by: STUDENT IN AN ORGANIZED HEALTH CARE EDUCATION/TRAINING PROGRAM

## 2025-03-16 RX ORDER — FERROUS SULFATE 325(65) MG
325 TABLET ORAL
Qty: 30 TABLET | Refills: 1 | Status: SHIPPED | OUTPATIENT
Start: 2025-03-16

## 2025-03-16 RX ORDER — IBUPROFEN 600 MG/1
600 TABLET, FILM COATED ORAL EVERY 6 HOURS SCHEDULED
Qty: 30 TABLET | Refills: 0 | Status: SHIPPED | OUTPATIENT
Start: 2025-03-16

## 2025-03-16 RX ORDER — ACETAMINOPHEN 325 MG/1
650 TABLET ORAL EVERY 6 HOURS
Qty: 30 TABLET | Refills: 1 | Status: SHIPPED | OUTPATIENT
Start: 2025-03-16

## 2025-03-16 RX ORDER — IBUPROFEN 600 MG/1
600 TABLET, FILM COATED ORAL EVERY 6 HOURS
Status: DISCONTINUED | OUTPATIENT
Start: 2025-03-17 | End: 2025-03-16

## 2025-03-16 RX ORDER — IBUPROFEN 600 MG/1
600 TABLET, FILM COATED ORAL EVERY 6 HOURS SCHEDULED
Status: DISCONTINUED | OUTPATIENT
Start: 2025-03-16 | End: 2025-03-16 | Stop reason: HOSPADM

## 2025-03-16 RX ADMIN — SENNOSIDES AND DOCUSATE SODIUM 1 TABLET: 50; 8.6 TABLET ORAL at 09:40

## 2025-03-16 RX ADMIN — IBUPROFEN 600 MG: 600 TABLET, FILM COATED ORAL at 09:38

## 2025-03-16 RX ADMIN — ACETAMINOPHEN 650 MG: 325 TABLET, FILM COATED ORAL at 13:59

## 2025-03-16 RX ADMIN — IBUPROFEN 600 MG: 600 TABLET, FILM COATED ORAL at 03:42

## 2025-03-16 RX ADMIN — PRENATAL VITAMINS-IRON FUMARATE 27 MG IRON-FOLIC ACID 0.8 MG TABLET 1 TABLET: at 09:37

## 2025-03-16 RX ADMIN — ACETAMINOPHEN 650 MG: 325 TABLET, FILM COATED ORAL at 06:10

## 2025-03-16 NOTE — DISCHARGE SUMMARY
[facility]   Obstetrics and Gynecology    Section Discharge Summary    Date of Admission: 3/14/2025  Date of Discharge:        Patient: Helen Colon      MR#:[id number]    Surgeon/OB: Rosamaria Byrd MD    Discharge Diagnosis:    section at 39w3d, uncomplicated recovery    Normal labor    Late prenatal care affecting pregnancy in second trimester    GBS (group B Streptococcus carrier), +RV culture, currently pregnant    Obesity (BMI 30-39.9)    Rupture of membranes with delay of delivery    Marijuana use        Procedures:  , Low Transverse    3/14/2025   11:37 PM     Anesthesia:  Epidural    Hospital Course  Patient is a 24 y.o. female  at 39w3d status post  section with uneventful postoperative recovery.  Patient was advanced to regular diet on postoperative day#1.  On discharge, ambulating, tolerating a regular diet without any difficulties and her incision is dry, clean and intact.     Infant:   female fetus 2980 g (6 lb 9.1 oz) with Apgar scores of 8 , 9  at five minutes.    Condition on Discharge:  Stable    Vital Signs  Temp:  [97.3 °F (36.3 °C)-98.4 °F (36.9 °C)] 97.5 °F (36.4 °C)  Heart Rate:  [64-90] 64  Resp:  [16] 16  BP: ()/(61-75) 98/65    Results from last 7 days   Lab Units 25  0554 25  0407   WBC 10*3/mm3 11.77* 10.09   HEMOGLOBIN g/dL 8.2* 12.3   HEMATOCRIT % 24.0* 36.2   PLATELETS 10*3/mm3 153 187             Discharge Disposition  Home or Self Care    Discharge Medications     Your medication list        START taking these medications        Instructions Last Dose Given Next Dose Due   acetaminophen 325 MG tablet  Commonly known as: TYLENOL      Take 2 tablets by mouth Every 6 (Six) Hours.       ferrous sulfate 325 (65 FE) MG tablet      Take 1 tablet by mouth Daily With Breakfast.       ibuprofen 600 MG tablet  Commonly known as: ADVIL,MOTRIN      Take 1 tablet by mouth Every 6 (Six) Hours.              CONTINUE taking  these medications        Instructions Last Dose Given Next Dose Due   PNV PO      Take  by mouth.                 Where to Get Your Medications        These medications were sent to [facility], [place], [place] - [address] [place] [address] - [phone] PH - [phone] FX  [address] [place] [address]      Phone: [phone]   acetaminophen 325 MG tablet  ferrous sulfate 325 (65 FE) MG tablet  ibuprofen 600 MG tablet           Discharge Diet: Regular    Follow-up Appointments- 2 weeks         Prenatal labs/vax:   Immunization History   Administered Date(s) Administered    Fluzone  >6mos 11/04/2024    Tdap 01/14/2025       External Prenatal Results       Pregnancy Outside Results - Transcribed From Office Records - See Scanned Records For Details       Test Value Date Time    ABO  O  03/14/25 0407    Rh  Positive  03/14/25 0407    Antibody Screen  Negative  03/14/25 0407       Negative  11/04/24 1617    Varicella IgG  Non Reactive  11/04/24 1617    Rubella  1.95 index 11/04/24 1617    Hgb  8.2 g/dL 03/16/25 0554       12.3 g/dL 03/14/25 0407       11.2 g/dL 01/14/25 1246       12.2 g/dL 11/04/24 1617    Hct  24.0 % 03/16/25 0554       36.2 % 03/14/25 0407       34.8 % 01/14/25 1246       37.3 % 11/04/24 1617    HgB A1c        1h GTT  89 mg/dL 01/14/25 1246    3h GTT Fasting       3h GTT 1 hour       3h GTT 2 hour       3h GTT 3 hour        Gonorrhea (discrete)  Negative  11/04/24 1623    Chlamydia (discrete)  Negative  11/04/24 1623    RPR  Non Reactive  11/04/24 1617    Syphils cascade: TP-Ab (FTA)  Non-Reactive  03/14/25 0407    TP-Ab  Non-Reactive  03/14/25 0407    TP-Ab (EIA)       TPPA       HBsAg  Negative  11/04/24 1617    Herpes Simplex Virus PCR       Herpes Simplex VIrus Culture       HIV  Non Reactive  11/04/24 1617    Hep C RNA Quant PCR       Hep C Antibody  Non-Reactive  03/14/25 0407    AFP       NIPT       Cystic Fibrosis ([place])       Cystic Fibroisis        Spinal Muscular  atrophy       Fragile X       Group B Strep  Positive  02/18/25 1512    GBS Susceptibility to Clindamycin       GBS Susceptibility to Erythromycin       Fetal Fibronectin       Genetic Testing, Maternal Blood                 Drug Screening       Test Value Date Time    Urine Drug Screen       Amphetamine Screen  Negative  03/14/25 0415       Negative ng/mL 11/04/24 1623    Barbiturate Screen  Negative  03/14/25 0415       Negative ng/mL 11/04/24 1623    Benzodiazepine Screen  Negative  03/14/25 0415       Negative ng/mL 11/04/24 1623    Methadone Screen  Negative  03/14/25 0415       Negative ng/mL 11/04/24 1623    Phencyclidine Screen  Negative  03/14/25 0415       Negative ng/mL 11/04/24 1623    Opiates Screen  Negative  03/14/25 0415    THC Screen  Positive  03/14/25 0415    Cocaine Screen       Propoxyphene Screen  Negative ng/mL 11/04/24 1623    Buprenorphine Screen  Negative  03/14/25 0415    Methamphetamine Screen       Oxycodone Screen  Negative  03/14/25 0415    Tricyclic Antidepressants Screen  Negative  03/14/25 0415              Legend    ^: Historical                              Satnam Cole MD  3/16/2025  10:26 EDT

## 2025-03-16 NOTE — PROGRESS NOTES
[facility]   PROGRESS NOTE    Post-Op Day 2 S/P     Subjective   CC: post  section    Patient reports:  Patient reports she is feeling well.  Pain is controlled with Motrin and Tylenol.  She is not feeling any dizziness.  She is ambulating, urinating and passing gas.  She is actually feels ready to go home.  No leg pain.      Objective      Vitals: Vital Signs Range for the last 24 hours  Temperature: Temp:  [97.3 °F (36.3 °C)-98.4 °F (36.9 °C)] 97.5 °F (36.4 °C)       BP: BP: ()/(61-75) 98/65   Pulse: Heart Rate:  [64-90] 64   Respirations: Resp:  [16] 16                            Physical exam   General-  no acute distress, conversant    Lungs- respirations unlabored   CV- trace LE edema   Abdomen- soft, nontender, nondistended.  Fundus is firm.   Incision -  Clean, dry, and intact with no erythema.   Lower extremities- nontender bilaterally    Results from last 7 days   Lab Units 25  0554   WBC 10*3/mm3 11.77*   HEMOGLOBIN g/dL 8.2*   HEMATOCRIT % 24.0*   PLATELETS 10*3/mm3 153         Assessment & Plan        Normal labor    Late prenatal care affecting pregnancy in second trimester    GBS (group B Streptococcus carrier), +RV culture, currently pregnant    Obesity (BMI 30-39.9)    Rupture of membranes with delay of delivery    Marijuana use      Assessment:    Helen WOODS Story is Day 2  post-op from      Patient is doing well.  We discussed anemia.  Patient feels ready to go home today.       Plan:   Discharge home today.  We discussed pelvic rest and no heavy lifting for 6 weeks.  C  Follow-up in the office in 2 weeks  We discussed anemia.  Patient will start iron when she is having bowel movements.          Satnam Cole MD  3/16/2025  10:16 EDT

## 2025-03-17 ENCOUNTER — MATERNAL SCREENING (OUTPATIENT)
Dept: CALL CENTER | Facility: HOSPITAL | Age: 25
End: 2025-03-17
Payer: COMMERCIAL

## 2025-03-17 NOTE — OUTREACH NOTE
Maternal Screening Survey      Flowsheet Row Responses   Eligibility Eligible   Prep survey completed? Yes   Facility patient discharged from? [facility] - Registered Nurse

## 2025-03-21 LAB
CANNABINOIDS UR QL CFM: NORMAL
CARBOXYTHC/CREAT UR: 24 NG/MG CREAT
LEVEL OF DETECTION:: NORMAL

## 2025-03-25 ENCOUNTER — MATERNAL SCREENING (OUTPATIENT)
Dept: CALL CENTER | Facility: HOSPITAL | Age: 25
End: 2025-03-25
Payer: COMMERCIAL

## 2025-03-25 NOTE — OUTREACH NOTE
Maternal Screening Survey      Flowsheet Row Responses   Facility patient discharged from? [facility]   Attempt successful? Yes   Call start time 1200   Call end time 1202   Person spoke with today (if not patient) and relationship Patient   I have been able to laugh and see the funny side of things. 0   I have looked forward with enjoyment to things. 0   I have blamed myself unnecessarily when things went wrong. 0   I have been anxious or worried for no good reason. 0   I have felt scared or panicky for no good reason. 0   Things have been getting on top of me. 0   I have been so unhappy that I have had difficulty sleeping. 0   I have felt sad or miserable. 0   I have been so unhappy that I have been crying. 0   The thought of harming myself has occurred to me. 0   [place]  Depression Scale Total 0   Did any of your parents have problems with alcohol or drug use? No   Do any of your peers have problems with alcohol or drug use? No   Does your partner have problems with alcohol or drug use? No   Before you were pregnant did you have problems with alcohol or drug use? (past) No   In the past month, did you drink beer, wine, liquor or use any other drugs? (pregnancy) No   Maternal Screening call completed Yes   Call end time 1202              Shay FLORENTINO - Registered Nurse

## 2025-03-31 NOTE — PROGRESS NOTES
"Continued Stay Note  [facility]     Patient Name: Helen Colon  MRN: [id number]  Today's Date: 3/31/2025    Admit Date: 3/14/2025    Plan: Infant may discharge to mother when medically ready. CSW will follow cord tox. SETH Lindo   Discharge Plan       Row Name 03/31/25 1429       Plan    Plan Comments Mother: Helen Colon, MRN [id number]; Infant: MalinasGirl \"Gardena\" Story, MRN [id number]. CSW reviewed cord toxicology for infant, and it was positive for Delta-9 Carboxy THC; this is lab confirmed. CSW submitted a CPS report (WebID # [id number]). SETH Harris.                   Discharge Codes    No documentation.                 Expected Discharge Date and Time       Expected Discharge Date Expected Discharge Time    Mar 16, 2025               LUCIA King    "

## 2025-04-01 ENCOUNTER — POSTPARTUM VISIT (OUTPATIENT)
Dept: OBSTETRICS AND GYNECOLOGY | Age: 25
End: 2025-04-01
Payer: COMMERCIAL

## 2025-04-01 VITALS
WEIGHT: 138 LBS | HEIGHT: 61 IN | SYSTOLIC BLOOD PRESSURE: 108 MMHG | DIASTOLIC BLOOD PRESSURE: 70 MMHG | BODY MASS INDEX: 26.06 KG/M2

## 2025-04-01 DIAGNOSIS — Z98.890 POST-OPERATIVE STATE: Primary | ICD-10-CM

## 2025-04-01 PROBLEM — Z37.9 NORMAL LABOR: Status: RESOLVED | Noted: 2025-03-14 | Resolved: 2025-04-01

## 2025-04-01 PROBLEM — O09.32 LATE PRENATAL CARE AFFECTING PREGNANCY IN SECOND TRIMESTER: Status: RESOLVED | Noted: 2024-11-04 | Resolved: 2025-04-01

## 2025-04-01 PROBLEM — O42.90 RUPTURE OF MEMBRANES WITH DELAY OF DELIVERY: Status: RESOLVED | Noted: 2025-03-14 | Resolved: 2025-04-01

## 2025-04-01 PROBLEM — Z34.90 PREGNANCY: Status: RESOLVED | Noted: 2024-12-03 | Resolved: 2025-04-01

## 2025-04-01 PROBLEM — O99.820 GBS (GROUP B STREPTOCOCCUS CARRIER), +RV CULTURE, CURRENTLY PREGNANT: Status: RESOLVED | Noted: 2025-02-20 | Resolved: 2025-04-01

## 2025-04-01 NOTE — PROGRESS NOTES
"Subjective   Chief Complaint   Patient presents with    Postpartum Care     Postpartum - CS delivery on 3/14/25, baby girl 6lb9.1oz, pt is breast feeding, pt has no complaints today     Helen Colon is a 24 y.o. year old  presenting to be seen for her post-operative visit.  Patient reports she is doing well.  Her daughter is doing great.  Patient is breast-feeding.  No complaints.  She is taking a vitamin.  Her hemoglobin was low at 8.  She is likely going to do condoms for birth control.  She has minimal occasional pain.    There was no pathology result associated with Helen's recent procedure.      OTHER THINGS SHE WANTS TO DISCUSS TODAY:  Nothing else    The following portions of the patient's history were reviewed and updated as appropriate:current medications and allergies       Objective   /70 (BP Location: Left arm, Patient Position: Sitting)   Ht 154.4 cm (60.79\")   Wt 62.6 kg (138 lb)   LMP 2024 (Exact Date)   Breastfeeding Yes   BMI 26.26 kg/m²     General:  well developed; well nourished  no acute distress  mentation appropriate   Abdomen: soft, non-tender; no masses  incision is clean, dry, intact, and without drainage   Pelvis: Not performed.          Assessment   S/P   Anemia with hemoglobin of 8.0-continue vitamins and iron and recheck at 6 weeks postpartum.     Plan   Lifting restriction of no more than 10 pounds  Nothing per vagina still until 6 weeks after her procedure  The importance of keeping all planned follow-up and taking all medications as prescribed was emphasized.  Follow up for annual exam and for postpartum visit 4 weeks.    No orders of the defined types were placed in this encounter.             Note: Speech recognition transcription software may have been used to create portions of this document.  An attempt at proofreading has been made but errors in transcription could still be present.     "

## 2025-04-30 ENCOUNTER — POSTPARTUM VISIT (OUTPATIENT)
Dept: OBSTETRICS AND GYNECOLOGY | Age: 25
End: 2025-04-30
Payer: COMMERCIAL

## 2025-04-30 VITALS
BODY MASS INDEX: 26.81 KG/M2 | SYSTOLIC BLOOD PRESSURE: 100 MMHG | DIASTOLIC BLOOD PRESSURE: 62 MMHG | WEIGHT: 142 LBS | HEIGHT: 61 IN

## 2025-04-30 DIAGNOSIS — D62 ANEMIA DUE TO ACUTE BLOOD LOSS: ICD-10-CM

## 2025-04-30 PROBLEM — E66.9 OBESITY (BMI 30-39.9): Status: RESOLVED | Noted: 2025-03-14 | Resolved: 2025-04-30

## 2025-04-30 LAB
ERYTHROCYTE [DISTWIDTH] IN BLOOD BY AUTOMATED COUNT: 13.4 % (ref 12.3–15.4)
HCT VFR BLD AUTO: 36.8 % (ref 34–46.6)
HGB BLD-MCNC: 11.9 G/DL (ref 12–15.9)
MCH RBC QN AUTO: 27 PG (ref 26.6–33)
MCHC RBC AUTO-ENTMCNC: 32.3 G/DL (ref 31.5–35.7)
MCV RBC AUTO: 83.6 FL (ref 79–97)
PLATELET # BLD AUTO: 231 10*3/MM3 (ref 140–450)
RBC # BLD AUTO: 4.4 10*6/MM3 (ref 3.77–5.28)
WBC # BLD AUTO: 6.58 10*3/MM3 (ref 3.4–10.8)

## 2025-04-30 NOTE — PROGRESS NOTES
"Darrion Colon is a 24 y.o. female who presents for a postpartum visit. She is 6 weeks postpartum following a c/s for FTP.  I have fully reviewed the prenatal and intrapartum course. Postpartum course has been uneventful. Baby is feeding by breast. Bleeding has been normal in amount and decreasing. Bowel function is normal. Bladder function is normal. Patient not sexually active at this time. Contraception method is discussed. Postpartum depression screening: negative.  The patient's daughter is doing well.    The following portions of the patient's history were reviewed and updated as appropriate: allergies, current medications,and problem list.    Review of Systems  Pertinent items are noted in HPI.    Objective   /62   Ht 154 cm (60.63\")   Wt 64.4 kg (142 lb)   Breastfeeding Yes   BMI 27.16 kg/m²    General:  Alert and oriented, NAD    Breasts:         Heart:     Abdomen: Normal findings, nontender    Vulva: Normal, well-healed    Vagina: No lesions or abnormal discharge   Cervix:  Normal with no cervical motion tenderness   Corpus: Normal for post partum visit   Adnexa:  Non tender, non enlarged         Assessment & Plan     Normal postpartum exam. Pap smear done at today's visit.  Anemia - hgb 8.0 - CBC today   Family history of ovarian cancer at a young age in her paternal aunt.  Patient has no contact with his aunt.  Patient declines doing genetic testing at this time.  We did discuss oral contraceptive pills can reduce risk of ovarian cancer.    1. Contraception: condoms   2. Slow return to normal activities reviewed.   3. Follow up in 12 months or sooner as needed.           "

## 2025-05-03 LAB
CONV .: NORMAL
CYTOLOGIST CVX/VAG CYTO: NORMAL
CYTOLOGY CVX/VAG DOC CYTO: NORMAL
CYTOLOGY CVX/VAG DOC THIN PREP: NORMAL
DX ICD CODE: NORMAL
OTHER STN SPEC: NORMAL
SERVICE CMNT-IMP: NORMAL
STAT OF ADQ CVX/VAG CYTO-IMP: NORMAL

## (undated) DEVICE — SUT MNCRYL 0 CT1 36IN UD MCP946H

## (undated) DEVICE — GLV SURG BIOGEL LTX PF 6 1/2

## (undated) DEVICE — ANTIBACTERIAL UNDYED BRAIDED (POLYGLACTIN 910), SYNTHETIC ABSORBABLE SUTURE: Brand: COATED VICRYL

## (undated) DEVICE — 3M™ TEGADERM™ TRANSPARENT FILM DRESSING FRAME STYLE, 1627, 4 IN X 10 IN (10 CM X 25 CM), 20/CT 4CT/CASE: Brand: 3M™ TEGADERM™

## (undated) DEVICE — SUT MONOCRYL PLS ANTIB UND 3/0  PS1 27IN